# Patient Record
Sex: FEMALE | Race: WHITE | NOT HISPANIC OR LATINO | URBAN - METROPOLITAN AREA
[De-identification: names, ages, dates, MRNs, and addresses within clinical notes are randomized per-mention and may not be internally consistent; named-entity substitution may affect disease eponyms.]

---

## 2017-01-23 ENCOUNTER — EMERGENCY (EMERGENCY)
Facility: HOSPITAL | Age: 57
LOS: 1 days | Discharge: HOME | End: 2017-01-23
Attending: STUDENT IN AN ORGANIZED HEALTH CARE EDUCATION/TRAINING PROGRAM
Payer: COMMERCIAL

## 2017-01-23 VITALS
TEMPERATURE: 98 F | RESPIRATION RATE: 16 BRPM | HEIGHT: 66 IN | OXYGEN SATURATION: 97 % | SYSTOLIC BLOOD PRESSURE: 96 MMHG | WEIGHT: 115.08 LBS | DIASTOLIC BLOOD PRESSURE: 61 MMHG | HEART RATE: 74 BPM

## 2017-01-23 VITALS
OXYGEN SATURATION: 98 % | HEART RATE: 74 BPM | SYSTOLIC BLOOD PRESSURE: 104 MMHG | TEMPERATURE: 98 F | DIASTOLIC BLOOD PRESSURE: 59 MMHG | RESPIRATION RATE: 16 BRPM

## 2017-01-23 DIAGNOSIS — Z90.710 ACQUIRED ABSENCE OF BOTH CERVIX AND UTERUS: Chronic | ICD-10-CM

## 2017-01-23 DIAGNOSIS — R07.81 PLEURODYNIA: ICD-10-CM

## 2017-01-23 DIAGNOSIS — M94.0 CHONDROCOSTAL JUNCTION SYNDROME [TIETZE]: ICD-10-CM

## 2017-01-23 DIAGNOSIS — Z98.89 OTHER SPECIFIED POSTPROCEDURAL STATES: Chronic | ICD-10-CM

## 2017-01-23 DIAGNOSIS — J06.9 ACUTE UPPER RESPIRATORY INFECTION, UNSPECIFIED: ICD-10-CM

## 2017-01-23 DIAGNOSIS — J40 BRONCHITIS, NOT SPECIFIED AS ACUTE OR CHRONIC: ICD-10-CM

## 2017-01-23 LAB
ALBUMIN SERPL ELPH-MCNC: 3.9 G/DL — SIGNIFICANT CHANGE UP (ref 3.3–5)
ALP SERPL-CCNC: 117 U/L — SIGNIFICANT CHANGE UP (ref 40–120)
ALT FLD-CCNC: 20 U/L — SIGNIFICANT CHANGE UP (ref 12–78)
ANION GAP SERPL CALC-SCNC: 7 MMOL/L — SIGNIFICANT CHANGE UP (ref 5–17)
APTT BLD: 29.3 SEC — SIGNIFICANT CHANGE UP (ref 27.5–37.4)
AST SERPL-CCNC: 13 U/L — LOW (ref 15–37)
BASOPHILS # BLD AUTO: 0.1 K/UL — SIGNIFICANT CHANGE UP (ref 0–0.2)
BASOPHILS NFR BLD AUTO: 1.7 % — SIGNIFICANT CHANGE UP (ref 0–2)
BILIRUB SERPL-MCNC: 0.3 MG/DL — SIGNIFICANT CHANGE UP (ref 0.2–1.2)
BUN SERPL-MCNC: 12 MG/DL — SIGNIFICANT CHANGE UP (ref 7–23)
CALCIUM SERPL-MCNC: 9.1 MG/DL — SIGNIFICANT CHANGE UP (ref 8.5–10.1)
CHLORIDE SERPL-SCNC: 106 MMOL/L — SIGNIFICANT CHANGE UP (ref 96–108)
CO2 SERPL-SCNC: 32 MMOL/L — HIGH (ref 22–31)
CREAT SERPL-MCNC: 0.76 MG/DL — SIGNIFICANT CHANGE UP (ref 0.5–1.3)
D DIMER BLD IA.RAPID-MCNC: <150 NG/ML DDU — SIGNIFICANT CHANGE UP
EOSINOPHIL # BLD AUTO: 0.1 K/UL — SIGNIFICANT CHANGE UP (ref 0–0.5)
EOSINOPHIL NFR BLD AUTO: 1.9 % — SIGNIFICANT CHANGE UP (ref 0–6)
GLUCOSE SERPL-MCNC: 69 MG/DL — LOW (ref 70–99)
HCT VFR BLD CALC: 43.8 % — SIGNIFICANT CHANGE UP (ref 34.5–45)
HGB BLD-MCNC: 15.4 G/DL — SIGNIFICANT CHANGE UP (ref 11.5–15.5)
INR BLD: 1.01 RATIO — SIGNIFICANT CHANGE UP (ref 0.88–1.16)
LYMPHOCYTES # BLD AUTO: 1.8 K/UL — SIGNIFICANT CHANGE UP (ref 1–3.3)
LYMPHOCYTES # BLD AUTO: 24.2 % — SIGNIFICANT CHANGE UP (ref 13–44)
MCHC RBC-ENTMCNC: 30.3 PG — SIGNIFICANT CHANGE UP (ref 27–34)
MCHC RBC-ENTMCNC: 35.2 GM/DL — SIGNIFICANT CHANGE UP (ref 32–36)
MCV RBC AUTO: 86.1 FL — SIGNIFICANT CHANGE UP (ref 80–100)
MONOCYTES # BLD AUTO: 0.4 K/UL — SIGNIFICANT CHANGE UP (ref 0–0.9)
MONOCYTES NFR BLD AUTO: 5.9 % — SIGNIFICANT CHANGE UP (ref 2–14)
NEUTROPHILS # BLD AUTO: 5 K/UL — SIGNIFICANT CHANGE UP (ref 1.8–7.4)
NEUTROPHILS NFR BLD AUTO: 66.4 % — SIGNIFICANT CHANGE UP (ref 43–77)
PLATELET # BLD AUTO: 216 K/UL — SIGNIFICANT CHANGE UP (ref 150–400)
POTASSIUM SERPL-MCNC: 4.4 MMOL/L — SIGNIFICANT CHANGE UP (ref 3.5–5.3)
POTASSIUM SERPL-SCNC: 4.4 MMOL/L — SIGNIFICANT CHANGE UP (ref 3.5–5.3)
PROT SERPL-MCNC: 7.6 GM/DL — SIGNIFICANT CHANGE UP (ref 6–8.3)
PROTHROM AB SERPL-ACNC: 11.3 SEC — SIGNIFICANT CHANGE UP (ref 10–13.1)
RBC # BLD: 5.09 M/UL — SIGNIFICANT CHANGE UP (ref 3.8–5.2)
RBC # FLD: 14.7 % — SIGNIFICANT CHANGE UP (ref 11–15)
SODIUM SERPL-SCNC: 145 MMOL/L — SIGNIFICANT CHANGE UP (ref 135–145)
WBC # BLD: 7.6 K/UL — SIGNIFICANT CHANGE UP (ref 3.8–10.5)
WBC # FLD AUTO: 7.6 K/UL — SIGNIFICANT CHANGE UP (ref 3.8–10.5)

## 2017-01-23 PROCEDURE — 99053 MED SERV 10PM-8AM 24 HR FAC: CPT

## 2017-01-23 PROCEDURE — 99285 EMERGENCY DEPT VISIT HI MDM: CPT | Mod: 25

## 2017-01-23 PROCEDURE — 76700 US EXAM ABDOM COMPLETE: CPT | Mod: 26

## 2017-01-23 PROCEDURE — 71010: CPT | Mod: 26

## 2017-01-23 RX ORDER — FAMOTIDINE 10 MG/ML
20 INJECTION INTRAVENOUS ONCE
Qty: 0 | Refills: 0 | Status: DISCONTINUED | OUTPATIENT
Start: 2017-01-23 | End: 2017-01-23

## 2017-01-23 RX ORDER — ONDANSETRON 8 MG/1
8 TABLET, FILM COATED ORAL ONCE
Qty: 0 | Refills: 0 | Status: DISCONTINUED | OUTPATIENT
Start: 2017-01-23 | End: 2017-01-23

## 2017-01-23 RX ORDER — SODIUM CHLORIDE 9 MG/ML
1000 INJECTION INTRAMUSCULAR; INTRAVENOUS; SUBCUTANEOUS
Qty: 0 | Refills: 0 | Status: DISCONTINUED | OUTPATIENT
Start: 2017-01-23 | End: 2017-01-23

## 2017-01-23 RX ORDER — SODIUM CHLORIDE 9 MG/ML
1000 INJECTION INTRAMUSCULAR; INTRAVENOUS; SUBCUTANEOUS
Qty: 0 | Refills: 0 | Status: DISCONTINUED | OUTPATIENT
Start: 2017-01-23 | End: 2017-01-27

## 2017-01-23 RX ADMIN — Medication 1 TABLET(S): at 13:07

## 2017-01-23 RX ADMIN — SODIUM CHLORIDE 125 MILLILITER(S): 9 INJECTION INTRAMUSCULAR; INTRAVENOUS; SUBCUTANEOUS at 08:54

## 2017-01-23 NOTE — ED PROVIDER NOTE - OBJECTIVE STATEMENT
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

## 2017-01-23 NOTE — ED ADULT NURSE NOTE - OBJECTIVE STATEMENT
Right rib cage pain with coughing and deep inspiration since Friday afternoon, cough for the past 2 weeks unrelieved with cough suppressant received from the doctor.

## 2017-01-23 NOTE — ED PROVIDER NOTE - MEDICAL DECISION MAKING DETAILS
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

## 2017-01-23 NOTE — ED ADULT NURSE NOTE - CAS EDN DISCHARGE ASSESSMENT
Symptoms improved/Alert and oriented to person, place and time/No adverse reaction to first time med in ED

## 2017-01-23 NOTE — ED PROVIDER NOTE - CARE PLAN
Principal Discharge DX:	Upper respiratory infection Principal Discharge DX:	Upper respiratory infection  Secondary Diagnosis:	Bronchitis  Secondary Diagnosis:	Costochondritis

## 2017-04-03 NOTE — ED PROVIDER NOTE - CPE EDP GASTRO NORM
Scheduled procedure with Patient at  [phone] (home)   Scheduled Via: Case Creation for [facility], case # [id number]  Patient prefers  facility rather than the doctor's preferred facility  Procedure date: 04-19-17  Procedure time: 1000AM  Rep Contacted?: n/a  [place] Program code:ACCESS CODE:[id number]      prep letter?: n/a      
normal...

## 2017-12-20 ENCOUNTER — INPATIENT (INPATIENT)
Facility: HOSPITAL | Age: 57
LOS: 1 days | Discharge: HOME | End: 2017-12-22
Attending: HOSPITALIST | Admitting: HOSPITALIST
Payer: COMMERCIAL

## 2017-12-20 VITALS
RESPIRATION RATE: 20 BRPM | WEIGHT: 119.93 LBS | DIASTOLIC BLOOD PRESSURE: 86 MMHG | HEIGHT: 66 IN | OXYGEN SATURATION: 91 % | SYSTOLIC BLOOD PRESSURE: 122 MMHG | HEART RATE: 108 BPM | TEMPERATURE: 99 F

## 2017-12-20 DIAGNOSIS — J44.1 CHRONIC OBSTRUCTIVE PULMONARY DISEASE WITH (ACUTE) EXACERBATION: ICD-10-CM

## 2017-12-20 DIAGNOSIS — Z98.89 OTHER SPECIFIED POSTPROCEDURAL STATES: Chronic | ICD-10-CM

## 2017-12-20 DIAGNOSIS — Z90.710 ACQUIRED ABSENCE OF BOTH CERVIX AND UTERUS: Chronic | ICD-10-CM

## 2017-12-20 LAB
ALBUMIN SERPL ELPH-MCNC: 3.5 G/DL — SIGNIFICANT CHANGE UP (ref 3.3–5)
ALP SERPL-CCNC: 102 U/L — SIGNIFICANT CHANGE UP (ref 40–120)
ALT FLD-CCNC: 28 U/L — SIGNIFICANT CHANGE UP (ref 12–78)
ANION GAP SERPL CALC-SCNC: 7 MMOL/L — SIGNIFICANT CHANGE UP (ref 5–17)
APPEARANCE UR: CLEAR — SIGNIFICANT CHANGE UP
AST SERPL-CCNC: 22 U/L — SIGNIFICANT CHANGE UP (ref 15–37)
BACTERIA # UR AUTO: ABNORMAL
BASOPHILS # BLD AUTO: 0.1 K/UL — SIGNIFICANT CHANGE UP (ref 0–0.2)
BASOPHILS NFR BLD AUTO: 1 % — SIGNIFICANT CHANGE UP (ref 0–2)
BILIRUB SERPL-MCNC: 0.7 MG/DL — SIGNIFICANT CHANGE UP (ref 0.2–1.2)
BILIRUB UR-MCNC: NEGATIVE — SIGNIFICANT CHANGE UP
BUN SERPL-MCNC: 14 MG/DL — SIGNIFICANT CHANGE UP (ref 7–23)
CALCIUM SERPL-MCNC: 9 MG/DL — SIGNIFICANT CHANGE UP (ref 8.5–10.1)
CHLORIDE SERPL-SCNC: 101 MMOL/L — SIGNIFICANT CHANGE UP (ref 96–108)
CK MB CFR SERPL CALC: 2.5 NG/ML — SIGNIFICANT CHANGE UP (ref 0.5–3.6)
CO2 SERPL-SCNC: 30 MMOL/L — SIGNIFICANT CHANGE UP (ref 22–31)
COLOR SPEC: YELLOW — SIGNIFICANT CHANGE UP
CREAT SERPL-MCNC: 0.7 MG/DL — SIGNIFICANT CHANGE UP (ref 0.5–1.3)
DIFF PNL FLD: ABNORMAL
EOSINOPHIL # BLD AUTO: 0 K/UL — SIGNIFICANT CHANGE UP (ref 0–0.5)
EOSINOPHIL NFR BLD AUTO: 0.1 % — SIGNIFICANT CHANGE UP (ref 0–6)
EPI CELLS # UR: SIGNIFICANT CHANGE UP
FLUAV SPEC QL CULT: NEGATIVE — SIGNIFICANT CHANGE UP
FLUBV AG SPEC QL IA: NEGATIVE — SIGNIFICANT CHANGE UP
GLUCOSE SERPL-MCNC: 125 MG/DL — HIGH (ref 70–99)
GLUCOSE UR QL: 250 MG/DL
HCT VFR BLD CALC: 43.9 % — SIGNIFICANT CHANGE UP (ref 34.5–45)
HGB BLD-MCNC: 15 G/DL — SIGNIFICANT CHANGE UP (ref 11.5–15.5)
KETONES UR-MCNC: ABNORMAL
LACTATE SERPL-SCNC: 1.3 MMOL/L — SIGNIFICANT CHANGE UP (ref 0.7–2)
LEUKOCYTE ESTERASE UR-ACNC: ABNORMAL
LYMPHOCYTES # BLD AUTO: 0.7 K/UL — LOW (ref 1–3.3)
LYMPHOCYTES # BLD AUTO: 8 % — LOW (ref 13–44)
MAGNESIUM SERPL-MCNC: 2.3 MG/DL — SIGNIFICANT CHANGE UP (ref 1.6–2.6)
MCHC RBC-ENTMCNC: 31.1 PG — SIGNIFICANT CHANGE UP (ref 27–34)
MCHC RBC-ENTMCNC: 34.2 GM/DL — SIGNIFICANT CHANGE UP (ref 32–36)
MCV RBC AUTO: 91.1 FL — SIGNIFICANT CHANGE UP (ref 80–100)
MONOCYTES # BLD AUTO: 0.9 K/UL — SIGNIFICANT CHANGE UP (ref 0–0.9)
MONOCYTES NFR BLD AUTO: 9.9 % — SIGNIFICANT CHANGE UP (ref 2–14)
NEUTROPHILS # BLD AUTO: 7.4 K/UL — SIGNIFICANT CHANGE UP (ref 1.8–7.4)
NEUTROPHILS NFR BLD AUTO: 81 % — HIGH (ref 43–77)
NITRITE UR-MCNC: NEGATIVE — SIGNIFICANT CHANGE UP
PH UR: 5 — SIGNIFICANT CHANGE UP (ref 5–8)
PLATELET # BLD AUTO: 214 K/UL — SIGNIFICANT CHANGE UP (ref 150–400)
POTASSIUM SERPL-MCNC: 3.9 MMOL/L — SIGNIFICANT CHANGE UP (ref 3.5–5.3)
POTASSIUM SERPL-SCNC: 3.9 MMOL/L — SIGNIFICANT CHANGE UP (ref 3.5–5.3)
PROT SERPL-MCNC: 8.2 GM/DL — SIGNIFICANT CHANGE UP (ref 6–8.3)
PROT UR-MCNC: 30 MG/DL
RBC # BLD: 4.82 M/UL — SIGNIFICANT CHANGE UP (ref 3.8–5.2)
RBC # FLD: 12.4 % — SIGNIFICANT CHANGE UP (ref 11–15)
RBC CASTS # UR COMP ASSIST: ABNORMAL /HPF (ref 0–4)
SODIUM SERPL-SCNC: 138 MMOL/L — SIGNIFICANT CHANGE UP (ref 135–145)
SP GR SPEC: 1.02 — SIGNIFICANT CHANGE UP (ref 1.01–1.02)
TROPONIN I SERPL-MCNC: <.015 NG/ML — SIGNIFICANT CHANGE UP (ref 0.01–0.04)
UROBILINOGEN FLD QL: 1 MG/DL
WBC # BLD: 9.1 K/UL — SIGNIFICANT CHANGE UP (ref 3.8–10.5)
WBC # FLD AUTO: 9.1 K/UL — SIGNIFICANT CHANGE UP (ref 3.8–10.5)
WBC UR QL: SIGNIFICANT CHANGE UP

## 2017-12-20 PROCEDURE — 99285 EMERGENCY DEPT VISIT HI MDM: CPT

## 2017-12-20 PROCEDURE — 99223 1ST HOSP IP/OBS HIGH 75: CPT

## 2017-12-20 PROCEDURE — 71010: CPT | Mod: 26

## 2017-12-20 RX ORDER — IPRATROPIUM/ALBUTEROL SULFATE 18-103MCG
3 AEROSOL WITH ADAPTER (GRAM) INHALATION ONCE
Qty: 0 | Refills: 0 | Status: COMPLETED | OUTPATIENT
Start: 2017-12-20 | End: 2017-12-20

## 2017-12-20 RX ORDER — AZITHROMYCIN 500 MG/1
500 TABLET, FILM COATED ORAL ONCE
Qty: 0 | Refills: 0 | Status: COMPLETED | OUTPATIENT
Start: 2017-12-20 | End: 2017-12-20

## 2017-12-20 RX ORDER — INFLUENZA VIRUS VACCINE 15; 15; 15; 15 UG/.5ML; UG/.5ML; UG/.5ML; UG/.5ML
0.5 SUSPENSION INTRAMUSCULAR ONCE
Qty: 0 | Refills: 0 | Status: COMPLETED | OUTPATIENT
Start: 2017-12-20 | End: 2017-12-22

## 2017-12-20 RX ORDER — CEFTRIAXONE 500 MG/1
1 INJECTION, POWDER, FOR SOLUTION INTRAMUSCULAR; INTRAVENOUS ONCE
Qty: 0 | Refills: 0 | Status: COMPLETED | OUTPATIENT
Start: 2017-12-20 | End: 2017-12-20

## 2017-12-20 RX ORDER — IPRATROPIUM/ALBUTEROL SULFATE 18-103MCG
3 AEROSOL WITH ADAPTER (GRAM) INHALATION EVERY 6 HOURS
Qty: 0 | Refills: 0 | Status: DISCONTINUED | OUTPATIENT
Start: 2017-12-20 | End: 2017-12-22

## 2017-12-20 RX ADMIN — Medication 3 MILLILITER(S): at 18:21

## 2017-12-20 RX ADMIN — AZITHROMYCIN 255 MILLIGRAM(S): 500 TABLET, FILM COATED ORAL at 17:04

## 2017-12-20 RX ADMIN — CEFTRIAXONE 100 GRAM(S): 500 INJECTION, POWDER, FOR SOLUTION INTRAMUSCULAR; INTRAVENOUS at 16:21

## 2017-12-20 RX ADMIN — Medication 3 MILLILITER(S): at 16:20

## 2017-12-20 RX ADMIN — Medication 3 MILLILITER(S): at 18:11

## 2017-12-20 RX ADMIN — Medication 125 MILLIGRAM(S): at 18:11

## 2017-12-20 RX ADMIN — Medication 3 MILLILITER(S): at 23:56

## 2017-12-20 RX ADMIN — Medication 40 MILLIGRAM(S): at 22:56

## 2017-12-20 NOTE — H&P ADULT - NSHPREVIEWOFSYSTEMS_GEN_ALL_CORE
CONSTITUTIONAL: No fever, weight loss, or fatigue  EYES: No eye pain, visual disturbances, or discharge  ENMT:  No difficulty hearing, tinnitus, vertigo; No sinus or throat pain  NECK: No pain or stiffness  RESPIRATORY: see history of present illness   CARDIOVASCULAR: No chest pain, palpitations, dizziness, or leg swelling  GASTROINTESTINAL: No abdominal or epigastric pain. No nausea, vomiting, or hematemesis; No diarrhea or constipation. No melena or hematochezia.  GENITOURINARY: No dysuria, frequency, hematuria, or incontinence  NEUROLOGICAL: No headaches, memory loss, loss of strength, numbness, or tremors  SKIN: No itching, burning, rashes, or lesions   LYMPH NODES: No enlarged glands  ENDOCRINE: No heat or cold intolerance; No hair loss  MUSCULOSKELETAL: No joint pain or swelling; No muscle, back, or extremity pain  PSYCHIATRIC: No depression, anxiety, mood swings, or difficulty sleeping  HEME/LYMPH: No easy bruising, or bleeding gums  ALLERGY AND IMMUNOLOGIC: No hives or eczema

## 2017-12-20 NOTE — H&P ADULT - NSHPLABSRESULTS_GEN_ALL_CORE
15.0   9.1   )-----------( 214      ( 20 Dec 2017 16:19 )             43.9   12-20    138  |  101  |  14  ----------------------------<  125<H>  3.9   |  30  |  0.70    Ca    9.0      20 Dec 2017 16:19  Mg     2.3     12-20    TPro  8.2  /  Alb  3.5  /  TBili  0.7  /  DBili  x   /  AST  22  /  ALT  28  /  AlkPhos  102  12-20  < from: Xray Chest 1 View AP/PA. (12.20.17 @ 16:59) >    Clear  lungs.  No acute airspace disease suggested.    < end of copied text >

## 2017-12-20 NOTE — H&P ADULT - ASSESSMENT
55former smoker with short of breath with cough     IMPROVE VTE Individual Risk Assessment          RISK                                                          Points    [  ] Previous VTE                                                3    [  ] Thrombophilia                                             2    [  ] Lower limb paralysis                                    2        (unable to hold up >15 seconds)      [  ] Current Cancer                                             2         (within 6 months)    [  ] Immobilization > 24 hrs                              1    [  ] ICU/CCU stay > 24 hours                            1    [  ] Age > 60                                                    1    IMPROVE VTE Score _____0____

## 2017-12-20 NOTE — ED ADULT TRIAGE NOTE - CHIEF COMPLAINT QUOTE
shortness of breath  coughing , paused lip breathing / tripoding  light headed , denies chest pain   onset  2days ago , getting worse

## 2017-12-20 NOTE — ED PROVIDER NOTE - OBJECTIVE STATEMENT
Pertinent PMH/PSH/FHx/SHx and Review of Systems contained within:  57F hx of smoking pw sob. symptoms began last week and worsening last night. notes non productive cough and increase in sob when coughing. no fever, cp, nausea, vomiting, abd pain. quit smoking last week as a result of her shortness of breath.   Fh and Sh not otherwise contributory  ROS otherwise negative

## 2017-12-20 NOTE — CONSULT NOTE ADULT - SUBJECTIVE AND OBJECTIVE BOX
Patient is a 57y old  Female who presents with a chief complaint of short of breath (20 Dec 2017 19:15)    HPI:  57F with no significant hx expect smoking for 37 years .  Came with SOB and cough along with chills for 4-5 days. 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 Denies fever but have been having hot and cold feelings for last few days.  Did not get flue vaccine.  37 pack year history of smoking, quit 1 week ago.    PAST MEDICAL & SURGICAL HISTORY:  No pertinent past medical history  H/O total hysterectomy  H/O:     FAMILY HISTORY:  No pertinent family history in first degree relatives    SOCIAL HISTORY: BMI (kg/m2): 19.4 . 37 pack year history of smoking, quit 1 week ago.    Allergies  penicillin (Rash)    MEDICATIONS  (STANDING):  ALBUTerol/ipratropium for Nebulization 3 milliLiter(s) Nebulizer every 6 hours  methylPREDNISolone sodium succinate Injectable 40 milliGRAM(s) IV Push every 6 hours    REVIEW OF SYSTEMS:    Constitutional:            No fever, weight loss or fatigue  HEENT:                     No difficulty hearing, tinnitus, vertigo; No sinus or throat pain  Respiratory:              sob and cough.  Cardiovascular:          No chest pain, palpitations  Gastrointestinal:         No abdominal or epigastric pain. No N/V/diarrhea   Genitourinary:            No dysuria, frequency, hematuria or incontinence  SKIN:                          no rash  Musculoskeletal:        No joint pain or swelling  Extremities:                No swelling  Neurological:              No headaches  Psychiatric:                 No depression, anxiety    Vital Signs Last 24 Hrs  T(C): 37 (20 Dec 2017 14:51), Max: 37 (20 Dec 2017 14:51)  T(F): 98.6 (20 Dec 2017 14:51), Max: 98.6 (20 Dec 2017 14:51)  HR: 108 (20 Dec 2017 14:51) (108 - 108)  BP: 122/86 (20 Dec 2017 14:51) (122/86 - 122/86)  BP(mean): --  RR: 20 (20 Dec 2017 14:51) (20 - 20)  SpO2: 91% (20 Dec 2017 14:51) (91% - 91%)    PHYSICAL EXAM:  GEN:        Awake, responsive and comfortable.  HEENT:    Normal.    RESP:         decreased air entry.  CVS:           Regular rate and rhythm.   ABD:         Soft, non-tender, non-distended;   :             No costovertebral angle tenderness  SKIN:           Warm and dry.  EXTR:            No clubbing, cyanosis or edema  CNS:              Intact sensory and motor function.  PSYCH:        cooperative, no anxiety or depression    LABS:                        15.0   9.1   )-----------( 214      ( 20 Dec 2017 16:19 )             43.9         138  |  101  |  14  ----------------------------<  125<H>  3.9   |  30  |  0.70    Ca    9.0      20 Dec 2017 16:19  Mg     2.3         TPro  8.2  /  Alb  3.5  /  TBili  0.7  /  DBili  x   /  AST  22  /  ALT  28  /  AlkPhos  102      EKG: sinus tachycardia    RADIOLOGY & ADDITIONAL STUDIES:  < from: Xray Chest 1 View AP/PA. (17 @ 16:59) >    EXAM:  CHEST SINGLE VIEW                          PROCEDURE DATE:  2017      INTERPRETATION:  cough    A frontal chest film  demonstrates grossly clear lungs.  No acute   infiltrate or consolidation is  noted. The costophrenic angles are   grossly clear.    The heart size and vascular markings are within normal limits for   technique.      No mediastinal or hilar adenopathy is suggested. .     The osseous structures appear intact intact.     IMPRESSION:  Clear  lungs.  No acute airspace disease suggested.    ANKIT GREENBERG M.D., ATTENDING RADIOLOGIST  This document has been electronically signed. Dec 20 2017  5:09PM      ASSESSMENT AND PLAN:  ·	SOB with wheezing.  ·	Acute COPD exacerbation.  ·	Tobacco abuse.    Nebulizer with short course of steroids.  Smoking cessation.  PFT out pt.

## 2017-12-20 NOTE — ED ADULT NURSE NOTE - OBJECTIVE STATEMENT
Pr c/o of coughing and difficulty breathing  since monday. Denies any chest pain. no Hs of asthma or copd

## 2017-12-20 NOTE — H&P ADULT - HISTORY OF PRESENT ILLNESS
57F hx of smoking pw sob. symptoms began last week and worsening last night. notes non productive cough and increase in sob when coughing. no fever, cp, nausea, vomiting, abd pain. quit smoking last week as a result of her shortness of breath.

## 2017-12-20 NOTE — ED PROVIDER NOTE - PHYSICAL EXAMINATION
Gen: Alert, NAD  Head: NC, AT   Eyes: PERRL, EOMI, normal lids/conjunctiva  ENT: normal hearing, patent oropharynx without erythema/exudate, uvula midline  Neck: supple, no tenderness, Trachea midline  Pulm: crackles throughout the lung fields, increased work of breathing, rr 28  CV: tachycardic. no M/R/G, 2+ radial and dp pulses bl, no edema  Abd: soft, NT/ND, +BS, no hepatosplenomegaly  Mskel: extremities x4 with normal ROM and no joint effusions. no ctl spine ttp.   Skin: no rash, no bruising   Neuro: AAOx3, no sensory/motor deficits, CN 2-12 intact

## 2017-12-20 NOTE — ED PROVIDER NOTE - MEDICAL DECISION MAKING DETAILS
patient pw respiratory compromise. suspect pna vs flu vs copd exacerbation. patient pw respiratory compromise. suspect pna vs flu vs copd exacerbation.   flu neg, cxray neg. likely copd exacerbation. will admit for nebs and steroids. patient pw respiratory compromise. suspect pna vs flu vs copd exacerbation.   flu neg, cxray neg. likely copd exacerbation. will admit for nebs and steroids.  I read ekg as sinus tach with no ischemic changes of st elevation or depression.

## 2017-12-20 NOTE — H&P ADULT - NSHPPHYSICALEXAM_GEN_ALL_CORE
GENERAL: NAD well-developed  HEAD:  Atraumatic, Normocephalic  EYES: EOMI, PERRLA, conjunctiva and sclera clear  ENMT: No tonsillar erythema, exudates, or enlargement; Moist mucous membranes, Good dentition, No lesions  NECK: Supple, No JVD, Normal thyroid  NERVOUS SYSTEM:  Alert & Oriented X3, Good concentration; Motor Strength 5/5 B/L upper and lower extremities; DTRs 2+ intact and symmetric  CHEST/LUNG: fine crackle throughout both lung fields with occasional wheezing to lower base   HEART: Regular rate and rhythm; No murmurs, rubs, or gallops  ABDOMEN: Soft, Nontender, Nondistended; Bowel sounds present  EXTREMITIES:  2+ Peripheral Pulses, No clubbing, cyanosis, or edema  LYMPH: No lymphadenopathy   SKIN: No rashes or lesions

## 2017-12-21 DIAGNOSIS — R73.09 OTHER ABNORMAL GLUCOSE: ICD-10-CM

## 2017-12-21 DIAGNOSIS — Z29.9 ENCOUNTER FOR PROPHYLACTIC MEASURES, UNSPECIFIED: ICD-10-CM

## 2017-12-21 DIAGNOSIS — B34.1 ENTEROVIRUS INFECTION, UNSPECIFIED: ICD-10-CM

## 2017-12-21 LAB
ANION GAP SERPL CALC-SCNC: 9 MMOL/L — SIGNIFICANT CHANGE UP (ref 5–17)
BUN SERPL-MCNC: 13 MG/DL — SIGNIFICANT CHANGE UP (ref 7–23)
CALCIUM SERPL-MCNC: 8.9 MG/DL — SIGNIFICANT CHANGE UP (ref 8.5–10.1)
CHLORIDE SERPL-SCNC: 102 MMOL/L — SIGNIFICANT CHANGE UP (ref 96–108)
CO2 SERPL-SCNC: 29 MMOL/L — SIGNIFICANT CHANGE UP (ref 22–31)
CREAT SERPL-MCNC: 0.78 MG/DL — SIGNIFICANT CHANGE UP (ref 0.5–1.3)
GLUCOSE SERPL-MCNC: 164 MG/DL — HIGH (ref 70–99)
HCT VFR BLD CALC: 44.6 % — SIGNIFICANT CHANGE UP (ref 34.5–45)
HGB BLD-MCNC: 14.9 G/DL — SIGNIFICANT CHANGE UP (ref 11.5–15.5)
MCHC RBC-ENTMCNC: 29.8 PG — SIGNIFICANT CHANGE UP (ref 27–34)
MCHC RBC-ENTMCNC: 33.3 GM/DL — SIGNIFICANT CHANGE UP (ref 32–36)
MCV RBC AUTO: 89.4 FL — SIGNIFICANT CHANGE UP (ref 80–100)
PLATELET # BLD AUTO: 238 K/UL — SIGNIFICANT CHANGE UP (ref 150–400)
POTASSIUM SERPL-MCNC: 3.7 MMOL/L — SIGNIFICANT CHANGE UP (ref 3.5–5.3)
POTASSIUM SERPL-SCNC: 3.7 MMOL/L — SIGNIFICANT CHANGE UP (ref 3.5–5.3)
RBC # BLD: 5 M/UL — SIGNIFICANT CHANGE UP (ref 3.8–5.2)
RBC # FLD: 11.9 % — SIGNIFICANT CHANGE UP (ref 11–15)
SODIUM SERPL-SCNC: 140 MMOL/L — SIGNIFICANT CHANGE UP (ref 135–145)
WBC # BLD: 8.5 K/UL — SIGNIFICANT CHANGE UP (ref 3.8–10.5)
WBC # FLD AUTO: 8.5 K/UL — SIGNIFICANT CHANGE UP (ref 3.8–10.5)

## 2017-12-21 PROCEDURE — 99233 SBSQ HOSP IP/OBS HIGH 50: CPT

## 2017-12-21 RX ADMIN — Medication 3 MILLILITER(S): at 23:38

## 2017-12-21 RX ADMIN — Medication 3 MILLILITER(S): at 05:24

## 2017-12-21 RX ADMIN — Medication 3 MILLILITER(S): at 17:36

## 2017-12-21 RX ADMIN — Medication 3 MILLILITER(S): at 11:13

## 2017-12-21 RX ADMIN — Medication 40 MILLIGRAM(S): at 05:34

## 2017-12-21 RX ADMIN — Medication 40 MILLIGRAM(S): at 11:04

## 2017-12-21 RX ADMIN — Medication 40 MILLIGRAM(S): at 22:17

## 2017-12-21 NOTE — PROGRESS NOTE ADULT - SUBJECTIVE AND OBJECTIVE BOX
INTERVAL HPI:  57F with no significant hx expect smoking for 37 years .  Came with SOB and cough along with chills for 4-5 days. 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 Denies fever but have been having hot and cold feelings for last few days.  Did not get flue vaccine.  37 pack year history of smoking, quit 1 week ago.    OVERNIGHT EVENTS:  Awake, comfortable and feels better.    Vital Signs Last 24 Hrs  T(C): 36.6 (21 Dec 2017 10:40), Max: 37.7 (20 Dec 2017 20:49)  T(F): 97.8 (21 Dec 2017 10:40), Max: 99.9 (20 Dec 2017 20:49)  HR: 77 (21 Dec 2017 11:14) (74 - 94)  BP: 110/71 (21 Dec 2017 10:40) (99/68 - 130/73)  BP(mean): --  RR: 16 (21 Dec 2017 10:40) (14 - 19)  SpO2: 97% (21 Dec 2017 11:14) (94% - 100%)    PHYSICAL EXAM:  GEN:         Awake, responsive and comfortable.  HEENT:    Normal.    RESP:       no wheezing.  CVS:         Regular rate and rhythm.   ABD:         Soft, non-tender, non-distended;     MEDICATIONS  (STANDING):  ALBUTerol/ipratropium for Nebulization 3 milliLiter(s) Nebulizer every 6 hours  influenza   Vaccine 0.5 milliLiter(s) IntraMuscular once  methylPREDNISolone sodium succinate Injectable 40 milliGRAM(s) IV Push every 6 hours    LABS:                        14.9   8.5   )-----------( 238      ( 21 Dec 2017 06:57 )             44.6     12-    140  |  102  |  13  ----------------------------<  164<H>  3.7   |  29  |  0.78    Ca    8.9      21 Dec 2017 06:57  Mg     2.3     12-    TPro  8.2  /  Alb  3.5  /  TBili  0.7  /  DBili  x   /  AST  22  /  ALT  28  /  AlkPhos  102  12-20    Urinalysis Basic - ( 20 Dec 2017 22:36 )    Color: Yellow / Appearance: Clear / S.020 / pH: x  Gluc: x / Ketone: Small  / Bili: Negative / Urobili: 1 mg/dL   Blood: x / Protein: 30 mg/dL / Nitrite: Negative   Leuk Esterase: Trace / RBC: 3-5 /HPF / WBC 0-2   Sq Epi: x / Non Sq Epi: Few / Bacteria: Moderate  ASSESSMENT AND PLAN:  ·	SOB with wheezing.  ·	Acute COPD exacerbation.  ·	Tobacco abuse.    Will continue nebulizer and reduce steroids.  Smoking cessation.  PFT out pt.

## 2017-12-21 NOTE — DIETITIAN INITIAL EVALUATION ADULT. - ETIOLOGY
reduced/inconsistent appetite/intake, borderline diabetes ? Dx reduced/inconsistent appetite/intake, increased exertion breathing

## 2017-12-21 NOTE — DIETITIAN INITIAL EVALUATION ADULT. - PERTINENT LABORATORY DATA
12-21 Na140 mmol/L Glu 164 mg/dL<H> K+ 3.7 mmol/L Cr  0.78 mg/dL BUN 13 mg/dL Phos n/a   Alb n/a   PAB n/a. POCT blood glucose not checked

## 2017-12-21 NOTE — DIETITIAN INITIAL EVALUATION ADULT. - PHYSICAL APPEARANCE
well nourished/BMI 16.4. No edema. Pt does not appear underweight. Nutrition focused physical exam conducted ; found signs of malnutrition [ X]absent [ ]present.  Subcutaneous fat loss: [ ] Orbital fat pads region, [ ]Buccal fat region, [ ]Triceps region,  [ ]Ribs region.  Muscle wasting: [ ]Temples region, [ ]Clavicle region, [ ]Shoulder region, [ ]Scapula region, [ ]Interosseous region,  [ ]thigh region, [ ]Calf region well nourished/BMI 19.3. No edema. Pt does not appear underweight. Nutrition focused physical exam conducted ; found signs of malnutrition [ X]absent [ ]present.  Subcutaneous fat loss: [ ] Orbital fat pads region, [ ]Buccal fat region, [ ]Triceps region,  [ ]Ribs region.  Muscle wasting: [ ]Temples region, [ ]Clavicle region, [ ]Shoulder region, [ ]Scapula region, [ ]Interosseous region,  [ ]thigh region, [ ]Calf region

## 2017-12-21 NOTE — DIETITIAN INITIAL EVALUATION ADULT. - ENERGY NEEDS
Ht= 66", Wt= 46 kg, IBW= 59 kg, %IBW= 78%, UBW= 56.8 kg, %UBW= 81%, BMI= 16.4 Ht= 66", Wt= 54.2 kg, IBW= 59 kg, %IBW= 92%, UBW= 56.8 kg, %UBW= 95%, BMI= 19.3

## 2017-12-21 NOTE — PROGRESS NOTE ADULT - SUBJECTIVE AND OBJECTIVE BOX
Patient is a 57y old  Female who presents with a chief complaint of short of breath (20 Dec 2017 19:15)      INTERVAL HPI/OVERNIGHT EVENTS: no acute events. Pt is enterovirus +. Repots breathing has improved     MEDICATIONS  (STANDING):  ALBUTerol/ipratropium for Nebulization 3 milliLiter(s) Nebulizer every 6 hours  influenza   Vaccine 0.5 milliLiter(s) IntraMuscular once  methylPREDNISolone sodium succinate Injectable 40 milliGRAM(s) IV Push every 6 hours    MEDICATIONS  (PRN):      Allergies    penicillin (Rash)    Intolerances        REVIEW OF SYSTEMS:  CONSTITUTIONAL: No fever, weight loss, or fatigue  EYES: No eye pain, visual disturbances, or discharge  ENMT:  No difficulty hearing, tinnitus, vertigo; No sinus or throat pain  NECK: No pain or stiffness  BREASTS: No pain, masses, or nipple discharge  RESPIRATORY: + cough, wheezing, chills   CARDIOVASCULAR: No chest pain, palpitations, dizziness, or leg swelling  GASTROINTESTINAL: No abdominal or epigastric pain. No nausea, vomiting, or hematemesis; No diarrhea or constipation. No melena or hematochezia.  GENITOURINARY: No dysuria, frequency, hematuria, or incontinence  NEUROLOGICAL: No headaches, memory loss, loss of strength, numbness, or tremors  SKIN: No itching, burning, rashes, or lesions   LYMPH NODES: No enlarged glands  ENDOCRINE: No heat or cold intolerance; No hair loss      Vital Signs Last 24 Hrs  T(C): 36.6 (21 Dec 2017 10:40), Max: 37.7 (20 Dec 2017 20:49)  T(F): 97.8 (21 Dec 2017 10:40), Max: 99.9 (20 Dec 2017 20:49)  HR: 77 (21 Dec 2017 11:14) (74 - 108)  BP: 110/71 (21 Dec 2017 10:40) (99/68 - 130/73)  BP(mean): --  RR: 16 (21 Dec 2017 10:40) (14 - 20)  SpO2: 97% (21 Dec 2017 11:14) (91% - 100%)    PHYSICAL EXAM:  GENERAL: NAD, well-groomed,  HEAD:  Atraumatic, Normocephalic  EYES: EOMI, PERRLA, conjunctiva and sclera clear  ENMT: No tonsillar erythema, exudates, or enlargement; Moist mucous membranes, Good dentition, No lesions  NECK: Supple, No JVD, Normal thyroid  NERVOUS SYSTEM:  Alert & Oriented X3, Good concentration; Motor Strength 5/5 B/L upper and lower extremities; DTRs 2+ intact and symmetric  CHEST/LUNG: wheezing b/l. speaking in full sentences. minimal distress  HEART: Regular rate and rhythm; No murmurs, rubs, or gallops  ABDOMEN: Soft, Nontender, Nondistended; Bowel sounds present  EXTREMITIES:  2+ Peripheral Pulses, No clubbing, cyanosis, or edema  LYMPH: No lymphadenopathy noted  SKIN: No rashes or lesions    LABS:                        14.9   8.5   )-----------( 238      ( 21 Dec 2017 06:57 )             44.6     12-    140  |  102  |  13  ----------------------------<  164<H>  3.7   |  29  |  0.78    Ca    8.9      21 Dec 2017 06:57  Mg     2.3     12-20    TPro  8.2  /  Alb  3.5  /  TBili  0.7  /  DBili  x   /  AST  22  /  ALT  28  /  AlkPhos  102  12-20      Urinalysis Basic - ( 20 Dec 2017 22:36 )    Color: Yellow / Appearance: Clear / S.020 / pH: x  Gluc: x / Ketone: Small  / Bili: Negative / Urobili: 1 mg/dL   Blood: x / Protein: 30 mg/dL / Nitrite: Negative   Leuk Esterase: Trace / RBC: 3-5 /HPF / WBC 0-2   Sq Epi: x / Non Sq Epi: Few / Bacteria: Moderate      CAPILLARY BLOOD GLUCOSE          RADIOLOGY & ADDITIONAL TESTS:    Imaging Personally Reviewed:  [ ] YES  [ ] NO    Consultant(s) Notes Reviewed:  [ x] YES  [ ] NO    Care Discussed with Consultants/Other Providers [ ] YES  [ ] NO

## 2017-12-21 NOTE — DIETITIAN INITIAL EVALUATION ADULT. - NS AS NUTRI INTERV MEALS SNACK
Based on HgbA1c result, consider changing diet Rx to CCHO c snack c Glucerna 8 oz daily (220 kcals, 10 gm pro). Recommend check lipid profile & regular fingersticks.

## 2017-12-21 NOTE — CHART NOTE - NSCHARTNOTEFT_GEN_A_CORE
Upon Nutritional Assessment by the Registered Dietitian your patient was determined to meet criteria / has evidence of the following diagnosis/diagnoses:          [ ]  Mild Protein Calorie Malnutrition        [ X]  Moderate Protein Calorie Malnutrition        [ ] Severe Protein Calorie Malnutrition        [ ] Unspecified Protein Calorie Malnutrition        [ ] Underweight / BMI <19        [ ] Morbid Obesity / BMI > 40      Findings as based on:  •  Comprehensive nutrition assessment and consultation  •  Calorie counts (nutrient intake analysis)  •  Food acceptance and intake status from observations by staff  •  Follow up  •  Patient education  •  Intervention secondary to interdisciplinary rounds  •   concerns      Treatment:    The following diet has been recommended:  Regular c Glucerna 8 oz daily (220 kcals, 10 gm pro). May consider changing to CCHO based on HgbA1c result.     PROVIDER Section:     By signing this assessment you are acknowledging and agree with the diagnosis/diagnoses assigned by the Registered Dietitian    Comments:

## 2017-12-21 NOTE — PROGRESS NOTE ADULT - ASSESSMENT
55 former smoker with likely COPD presents with SOB and cough and c.w copd exacerbation d.t viral illness. pt reports feeling better since admission and remains hemodynamically stable. Will monitor o2 sats off NC

## 2017-12-22 ENCOUNTER — TRANSCRIPTION ENCOUNTER (OUTPATIENT)
Age: 57
End: 2017-12-22

## 2017-12-22 VITALS — OXYGEN SATURATION: 90 %

## 2017-12-22 LAB
CULTURE RESULTS: NO GROWTH — SIGNIFICANT CHANGE UP
HBA1C BLD-MCNC: 5.6 % — SIGNIFICANT CHANGE UP (ref 4–5.6)
SPECIMEN SOURCE: SIGNIFICANT CHANGE UP

## 2017-12-22 PROCEDURE — 99239 HOSP IP/OBS DSCHRG MGMT >30: CPT

## 2017-12-22 RX ORDER — ACETYLCYSTEINE 200 MG/ML
10 VIAL (ML) MISCELLANEOUS ONCE
Qty: 0 | Refills: 0 | Status: DISCONTINUED | OUTPATIENT
Start: 2017-12-22 | End: 2017-12-22

## 2017-12-22 RX ORDER — FLUTICASONE PROPIONATE AND SALMETEROL 50; 250 UG/1; UG/1
1 POWDER ORAL; RESPIRATORY (INHALATION)
Qty: 1 | Refills: 4 | OUTPATIENT
Start: 2017-12-22 | End: 2018-05-20

## 2017-12-22 RX ORDER — ALBUTEROL 90 UG/1
2 AEROSOL, METERED ORAL
Qty: 1 | Refills: 4 | OUTPATIENT
Start: 2017-12-22 | End: 2018-05-20

## 2017-12-22 RX ORDER — ACETYLCYSTEINE 200 MG/ML
2 VIAL (ML) MISCELLANEOUS ONCE
Qty: 0 | Refills: 0 | Status: COMPLETED | OUTPATIENT
Start: 2017-12-22 | End: 2017-12-22

## 2017-12-22 RX ADMIN — Medication 200 MILLIGRAM(S): at 13:50

## 2017-12-22 RX ADMIN — Medication 3 MILLILITER(S): at 11:05

## 2017-12-22 RX ADMIN — Medication 40 MILLIGRAM(S): at 13:49

## 2017-12-22 RX ADMIN — Medication 40 MILLIGRAM(S): at 05:20

## 2017-12-22 RX ADMIN — INFLUENZA VIRUS VACCINE 0.5 MILLILITER(S): 15; 15; 15; 15 SUSPENSION INTRAMUSCULAR at 14:36

## 2017-12-22 RX ADMIN — Medication 2 MILLILITER(S): at 14:20

## 2017-12-22 NOTE — DISCHARGE NOTE ADULT - MEDICATION SUMMARY - MEDICATIONS TO STOP TAKING
I will STOP taking the medications listed below when I get home from the hospital:    Naprosyn 500 mg oral tablet  -- 1 tab(s) by mouth 2 times a day, take with food  -- Check with your doctor before becoming pregnant.  May cause drowsiness or dizziness.  Obtain medical advice before taking any non-prescription drugs as some may affect the action of this medication.  Take with food or milk.    amoxicillin-clavulanate 875 mg-125 mg oral tablet  -- 1 tab(s) by mouth 2 times a day  -- Finish all this medication unless otherwise directed by prescriber.  Take with food or milk.

## 2017-12-22 NOTE — DISCHARGE NOTE ADULT - PATIENT PORTAL LINK FT
“You can access the [facility] Patient Portal, offered by [facility], by registering with the following website: [email or web address]”

## 2017-12-22 NOTE — PROGRESS NOTE ADULT - SUBJECTIVE AND OBJECTIVE BOX
INTERVAL HPI:  57F with no significant hx expect smoking for 37 years .  Came with SOB and cough along with chills for 4-5 days. 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 Denies fever but have been having hot and cold feelings for last few days.  Did not get flue vaccine.  37 pack year history of smoking, quit 1 week ago.    OVERNIGHT EVENTS:  Some what better but cough persists. RVP positive for entero/Rhino Virus.    Vital Signs Last 24 Hrs  T(C): 37 (22 Dec 2017 10:45), Max: 37 (22 Dec 2017 10:45)  T(F): 98.6 (22 Dec 2017 10:45), Max: 98.6 (22 Dec 2017 10:45)  HR: 79 (22 Dec 2017 12:14) (73 - 100)  BP: 114/69 (22 Dec 2017 10:45) (98/55 - 122/63)  BP(mean): --  RR: 18 (22 Dec 2017 10:45) (16 - 18)  SpO2: 90% (22 Dec 2017 12:14) (90% - 98%)    PHYSICAL EXAM:  GEN:         Awake, responsive and comfortable.  HEENT:    Normal.    RESP:        no wheezing.  CVS:          Regular rate and rhythm.   ABD:         Soft, non-tender, non-distended;     MEDICATIONS  (STANDING):  ALBUTerol/ipratropium for Nebulization 3 milliLiter(s) Nebulizer every 6 hours  influenza   Vaccine 0.5 milliLiter(s) IntraMuscular once  methylPREDNISolone sodium succinate Injectable 40 milliGRAM(s) IV Push every 8 hours    LABS:                        14.9   8.5   )-----------( 238      ( 21 Dec 2017 06:57 )             44.6     12-    140  |  102  |  13  ----------------------------<  164<H>  3.7   |  29  |  0.78    Ca    8.9      21 Dec 2017 06:57  Mg     2.3     12-20    TPro  8.2  /  Alb  3.5  /  TBili  0.7  /  DBili  x   /  AST  22  /  ALT  28  /  AlkPhos  102  12-20    Urinalysis Basic - ( 20 Dec 2017 22:36 )    Color: Yellow / Appearance: Clear / S.020 / pH: x  Gluc: x / Ketone: Small  / Bili: Negative / Urobili: 1 mg/dL   Blood: x / Protein: 30 mg/dL / Nitrite: Negative   Leuk Esterase: Trace / RBC: 3-5 /HPF / WBC 0-2   Sq Epi: x / Non Sq Epi: Few / Bacteria: Moderate    ASSESSMENT AND PLAN:  ·	SOB with wheezing.  ·	Acute COPD exacerbation.  ·	Entero/Rhino virus Tracheobronchitis  ·	Tobacco abuse.    Taper  steroids over 5-7 days.  Bronchodilators as needed.  PFT out pt.

## 2017-12-22 NOTE — CHART NOTE - NSCHARTNOTEFT_GEN_A_CORE
Ms. Ruthann Cooney was discharged from the hospital on 12/22/17 and can return to work on 12/26/17, but should be on light duty until 12/29/17.

## 2017-12-22 NOTE — DISCHARGE NOTE ADULT - CARE PROVIDER_API CALL
pcp,   Phone: (   )    -  Fax: (   )    -    Amol Hernandez (MB[employer]), Medicine  [address] [place] [address]  Phone: [phone]  Fax: [phone]

## 2017-12-22 NOTE — DISCHARGE NOTE ADULT - MEDICATION SUMMARY - MEDICATIONS TO TAKE
I will START or STAY ON the medications listed below when I get home from the hospital:    Deltasone 20 mg oral tablet  -- 2 tab(s) by mouth once a day   -- It is very important that you take or use this exactly as directed.  Do not skip doses or discontinue unless directed by your doctor.  Obtain medical advice before taking any non-prescription drugs as some may affect the action of this medication.  Take with food or milk.    -- Indication: For COPD exacerbation    albuterol 90 mcg/inh inhalation powder  -- 2 puff(s) inhaled every 4 hours as need for shortness of breath   -- For inhalation only.  It is very important that you take or use this exactly as directed.  Do not skip doses or discontinue unless directed by your doctor.  Obtain medical advice before taking any non-prescription drugs as some may affect the action of this medication.    -- Indication: For COPD exacerbation    Advair Diskus 250 mcg-50 mcg inhalation powder  -- 1 puff(s) inhaled 2 times a day   -- Check with your doctor before becoming pregnant.  For inhalation only.  Obtain medical advice before taking any non-prescription drugs as some may affect the action of this medication.  Rinse mouth thoroughly after use.    -- Indication: For COPD exacerbation    guaiFENesin 100 mg/5 mL oral liquid  -- 10 milliliter(s) by mouth every 6 hours, As needed, Cough  -- Indication: For COugh

## 2017-12-22 NOTE — DISCHARGE NOTE ADULT - HOSPITAL COURSE
55 former smoker with likely COPD presents with SOB and cough and c.w copd exacerbation d.t viral illness. Patient was started on steroids and bronchodilators and breathing improved. pt continues to improve and  remains hemodynamically stable. 02 sats are normal with ambulation              Problem/Plan - 1:  ·  Problem: COPD exacerbation.  Plan: steroids/ inhalers  pulmonary follow up   smoking cessation      Problem/Plan - 2:  ·  Problem: Elevated glucose.  Plan: aic. 5.6      Problem/Plan - 3:  ·  Problem: Preventive measure.  Plan: ambulation for dvt.      Problem/Plan - 4:  ·  Problem: Enterovirus infection.  Plan: supportive care.

## 2017-12-22 NOTE — DISCHARGE NOTE ADULT - CARE PLAN
Principal Discharge DX:	COPD exacerbation  Goal:	follow up with pulmonary  Instructions for follow-up, activity and diet:	take inhalers and steroids   smoking cessation

## 2017-12-26 LAB
CULTURE RESULTS: SIGNIFICANT CHANGE UP
CULTURE RESULTS: SIGNIFICANT CHANGE UP
SPECIMEN SOURCE: SIGNIFICANT CHANGE UP
SPECIMEN SOURCE: SIGNIFICANT CHANGE UP

## 2017-12-28 DIAGNOSIS — F17.210 NICOTINE DEPENDENCE, CIGARETTES, UNCOMPLICATED: ICD-10-CM

## 2017-12-28 DIAGNOSIS — B34.1 ENTEROVIRUS INFECTION, UNSPECIFIED: ICD-10-CM

## 2017-12-28 DIAGNOSIS — Z88.0 ALLERGY STATUS TO PENICILLIN: ICD-10-CM

## 2017-12-28 DIAGNOSIS — R73.9 HYPERGLYCEMIA, UNSPECIFIED: ICD-10-CM

## 2017-12-28 DIAGNOSIS — Z72.0 TOBACCO USE: ICD-10-CM

## 2017-12-28 DIAGNOSIS — J44.1 CHRONIC OBSTRUCTIVE PULMONARY DISEASE WITH (ACUTE) EXACERBATION: ICD-10-CM

## 2017-12-28 DIAGNOSIS — Z90.710 ACQUIRED ABSENCE OF BOTH CERVIX AND UTERUS: ICD-10-CM

## 2017-12-28 DIAGNOSIS — J20.8 ACUTE BRONCHITIS DUE TO OTHER SPECIFIED ORGANISMS: ICD-10-CM

## 2018-11-07 NOTE — DISCHARGE NOTE ADULT - NS AS DC AMI YN
"Pt noted to be removing clothing in room, removing bipap and talking to self.  When RN entered room patient noted to have pulled IV out of her arm, had a bowel movement in the bed, and was speaking to \"people\" that were not present in the room.  RN attempted to help patient safely position in the bed, stop the bleeding in her arm where the IV had been pulled out, and clean up after her bowel movement.  Patient got combative with RN and tech.  Patient began shouting, screaming, kicking and attempting to hit staff.  Pt screaming out again to people she was hallucinating as being in the room.  MD on unit, at bedside, security called.  Orders per MD at bedside.  See MAR.    " no

## 2018-12-10 ENCOUNTER — EMERGENCY (EMERGENCY)
Facility: HOSPITAL | Age: 58
LOS: 0 days | Discharge: HOME | End: 2018-12-11
Attending: EMERGENCY MEDICINE
Payer: COMMERCIAL

## 2018-12-10 VITALS
HEIGHT: 66 IN | OXYGEN SATURATION: 96 % | RESPIRATION RATE: 17 BRPM | TEMPERATURE: 99 F | HEART RATE: 85 BPM | DIASTOLIC BLOOD PRESSURE: 71 MMHG | WEIGHT: 119.93 LBS | SYSTOLIC BLOOD PRESSURE: 122 MMHG

## 2018-12-10 DIAGNOSIS — J44.1 CHRONIC OBSTRUCTIVE PULMONARY DISEASE WITH (ACUTE) EXACERBATION: ICD-10-CM

## 2018-12-10 DIAGNOSIS — Z98.89 OTHER SPECIFIED POSTPROCEDURAL STATES: Chronic | ICD-10-CM

## 2018-12-10 DIAGNOSIS — R05 COUGH: ICD-10-CM

## 2018-12-10 DIAGNOSIS — Z90.710 ACQUIRED ABSENCE OF BOTH CERVIX AND UTERUS: Chronic | ICD-10-CM

## 2018-12-10 DIAGNOSIS — Z88.0 ALLERGY STATUS TO PENICILLIN: ICD-10-CM

## 2018-12-10 PROCEDURE — 99285 EMERGENCY DEPT VISIT HI MDM: CPT

## 2018-12-10 NOTE — ED ADULT TRIAGE NOTE - CHIEF COMPLAINT QUOTE
pt c/o cough with clear phlegm,  difficulty breathing, nausea and headache x 3 days.  current everyday smoker

## 2018-12-11 VITALS
DIASTOLIC BLOOD PRESSURE: 58 MMHG | SYSTOLIC BLOOD PRESSURE: 108 MMHG | HEART RATE: 74 BPM | RESPIRATION RATE: 16 BRPM | OXYGEN SATURATION: 93 %

## 2018-12-11 LAB
ALBUMIN SERPL ELPH-MCNC: 3.6 G/DL — SIGNIFICANT CHANGE UP (ref 3.3–5)
ALP SERPL-CCNC: 104 U/L — SIGNIFICANT CHANGE UP (ref 40–120)
ALT FLD-CCNC: 24 U/L — SIGNIFICANT CHANGE UP (ref 12–78)
ANION GAP SERPL CALC-SCNC: 8 MMOL/L — SIGNIFICANT CHANGE UP (ref 5–17)
APTT BLD: 30.8 SEC — SIGNIFICANT CHANGE UP (ref 28.5–37)
AST SERPL-CCNC: 18 U/L — SIGNIFICANT CHANGE UP (ref 15–37)
BILIRUB SERPL-MCNC: 0.4 MG/DL — SIGNIFICANT CHANGE UP (ref 0.2–1.2)
BUN SERPL-MCNC: 11 MG/DL — SIGNIFICANT CHANGE UP (ref 7–23)
CALCIUM SERPL-MCNC: 8.7 MG/DL — SIGNIFICANT CHANGE UP (ref 8.5–10.1)
CHLORIDE SERPL-SCNC: 104 MMOL/L — SIGNIFICANT CHANGE UP (ref 96–108)
CK MB BLD-MCNC: 1.6 % — SIGNIFICANT CHANGE UP (ref 0–3.5)
CK MB CFR SERPL CALC: 2 NG/ML — SIGNIFICANT CHANGE UP (ref 0.5–3.6)
CK SERPL-CCNC: 124 U/L — SIGNIFICANT CHANGE UP (ref 26–192)
CO2 SERPL-SCNC: 29 MMOL/L — SIGNIFICANT CHANGE UP (ref 22–31)
CREAT SERPL-MCNC: 0.69 MG/DL — SIGNIFICANT CHANGE UP (ref 0.5–1.3)
D DIMER BLD IA.RAPID-MCNC: <150 NG/ML DDU — SIGNIFICANT CHANGE UP
GLUCOSE SERPL-MCNC: 109 MG/DL — HIGH (ref 70–99)
HCT VFR BLD CALC: 43.1 % — SIGNIFICANT CHANGE UP (ref 34.5–45)
HGB BLD-MCNC: 14.1 G/DL — SIGNIFICANT CHANGE UP (ref 11.5–15.5)
INR BLD: 1.11 RATIO — SIGNIFICANT CHANGE UP (ref 0.88–1.16)
MCHC RBC-ENTMCNC: 29.1 PG — SIGNIFICANT CHANGE UP (ref 27–34)
MCHC RBC-ENTMCNC: 32.7 GM/DL — SIGNIFICANT CHANGE UP (ref 32–36)
MCV RBC AUTO: 88.9 FL — SIGNIFICANT CHANGE UP (ref 80–100)
NRBC # BLD: 0 /100 WBCS — SIGNIFICANT CHANGE UP (ref 0–0)
PLATELET # BLD AUTO: 318 K/UL — SIGNIFICANT CHANGE UP (ref 150–400)
POTASSIUM SERPL-MCNC: 3.8 MMOL/L — SIGNIFICANT CHANGE UP (ref 3.5–5.3)
POTASSIUM SERPL-SCNC: 3.8 MMOL/L — SIGNIFICANT CHANGE UP (ref 3.5–5.3)
PROT SERPL-MCNC: 7.5 GM/DL — SIGNIFICANT CHANGE UP (ref 6–8.3)
PROTHROM AB SERPL-ACNC: 12.5 SEC — SIGNIFICANT CHANGE UP (ref 10–12.9)
RBC # BLD: 4.85 M/UL — SIGNIFICANT CHANGE UP (ref 3.8–5.2)
RBC # FLD: 12.7 % — SIGNIFICANT CHANGE UP (ref 10.3–14.5)
SODIUM SERPL-SCNC: 141 MMOL/L — SIGNIFICANT CHANGE UP (ref 135–145)
TROPONIN I SERPL-MCNC: <.015 NG/ML — SIGNIFICANT CHANGE UP (ref 0.01–0.04)
WBC # BLD: 9.42 K/UL — SIGNIFICANT CHANGE UP (ref 3.8–10.5)
WBC # FLD AUTO: 9.42 K/UL — SIGNIFICANT CHANGE UP (ref 3.8–10.5)

## 2018-12-11 PROCEDURE — 71045 X-RAY EXAM CHEST 1 VIEW: CPT | Mod: 26

## 2018-12-11 RX ORDER — AZITHROMYCIN 500 MG/1
500 TABLET, FILM COATED ORAL ONCE
Qty: 0 | Refills: 0 | Status: COMPLETED | OUTPATIENT
Start: 2018-12-11 | End: 2018-12-11

## 2018-12-11 RX ORDER — AZITHROMYCIN 500 MG/1
6.5 TABLET, FILM COATED ORAL
Qty: 26 | Refills: 0 | OUTPATIENT
Start: 2018-12-11 | End: 2018-12-14

## 2018-12-11 RX ORDER — ALBUTEROL 90 UG/1
1 AEROSOL, METERED ORAL ONCE
Qty: 0 | Refills: 0 | Status: COMPLETED | OUTPATIENT
Start: 2018-12-11 | End: 2018-12-11

## 2018-12-11 RX ORDER — IPRATROPIUM/ALBUTEROL SULFATE 18-103MCG
3 AEROSOL WITH ADAPTER (GRAM) INHALATION
Qty: 0 | Refills: 0 | Status: COMPLETED | OUTPATIENT
Start: 2018-12-11 | End: 2018-12-11

## 2018-12-11 RX ORDER — ASPIRIN/CALCIUM CARB/MAGNESIUM 324 MG
325 TABLET ORAL ONCE
Qty: 0 | Refills: 0 | Status: COMPLETED | OUTPATIENT
Start: 2018-12-11 | End: 2018-12-11

## 2018-12-11 RX ADMIN — Medication 125 MILLIGRAM(S): at 00:48

## 2018-12-11 RX ADMIN — ALBUTEROL 1 PUFF(S): 90 AEROSOL, METERED ORAL at 03:11

## 2018-12-11 RX ADMIN — Medication 3 MILLILITER(S): at 01:15

## 2018-12-11 RX ADMIN — Medication 3 MILLILITER(S): at 00:48

## 2018-12-11 RX ADMIN — Medication 3 MILLILITER(S): at 01:00

## 2018-12-11 RX ADMIN — Medication 325 MILLIGRAM(S): at 00:48

## 2018-12-11 RX ADMIN — Medication 200 MILLIGRAM(S): at 02:14

## 2018-12-11 RX ADMIN — AZITHROMYCIN 500 MILLIGRAM(S): 500 TABLET, FILM COATED ORAL at 03:20

## 2018-12-11 NOTE — ED ADULT NURSE NOTE - OBJECTIVE STATEMENT
58F aaox4 ambulatory p/w c/o coughing for few days now, current everyday cigarettes smoker, reports cough is productive with whitish phlegm, denies any sick contact, long travels. No chills or fever, sob, or diaphoresis. Complains of chest discomfort and nausea when coughing. Pending to be seen by MD.

## 2018-12-11 NOTE — ED PROVIDER NOTE - NS_EDPROVIDERDISPOUSERTYPE_ED_A_ED
Here for cough and shortness of breath, take inhaler as directed, start abx and prednisone and check cxray r/o infiltrate, quit tobacco and recheck in 1 week  Call if worse or ER for cough and SOB  Attending Attestation (For Attendings USE Only)...

## 2018-12-11 NOTE — ED PROVIDER NOTE - MEDICAL DECISION MAKING DETAILS
Pt with copd exacerbation. dc with inharler, azithro, prednisone. Discussed results and outcome of testing with the patient.  Patient given copy of available results. Patient advised to please follow up with their PMD within the next 24 hours and return to the Emergency Department for worsening symptoms or any other concerns.

## 2018-12-11 NOTE — ED ADULT NURSE NOTE - NSIMPLEMENTINTERV_GEN_ALL_ED
Implemented All Universal Safety Interventions:  Telephone to call system. Call bell, personal items and telephone within reach. Instruct patient to call for assistance. Room bathroom lighting operational. Non-slip footwear when patient is off stretcher. Physically safe environment: no spills, clutter or unnecessary equipment. Stretcher in lowest position, wheels locked, appropriate side rails in place.

## 2018-12-11 NOTE — ED ADULT NURSE NOTE - CHPI ED NUR SYMPTOMS NEG
no wheezing/no shortness of breath/no edema/no fever/no diaphoresis/no hemoptysis/no chills/no body aches

## 2018-12-11 NOTE — ED PROVIDER NOTE - PHYSICAL EXAMINATION
Gen: Alert, Well appearing. NAD    Head: NC, AT, PERRL, EOMI, normal lids/conjunctiva   ENT: Bilateral TM WNL, normal hearing, patent oropharynx without erythema/exudate, uvula midline  Neck: supple, no tenderness/meningismus/JVD   Pulm: diminished on left, diffuse wheeze on rt  CV: RRR, no M/R/G, +dist pulses   Abd: soft, NT/ND, +BS, no guarding/rebound tenderness  Mskel: no edema/erythema/cyanosis   Skin: no rash   Neuro: AAOx3, no sensory/motor deficits, CN 2-12 intact

## 2018-12-11 NOTE — ED PROVIDER NOTE - OBJECTIVE STATEMENT
59yo female with no pertinent pmh, + smoker, "a little COPD", presents with cough with clear sputum, sob, and left sided cp, nonradiating x few days.     ROS: No fever/chills. No photophobia/eye pain/changes in vision, No ear pain/sore throat/dysphagia, + chest pain, no palpitations. + SOB/cough. no abdominal pain, N/V/D, no black/bloody bm. No dysuria/frequency/discharge, No headache. No Dizziness.  No rash.  No numbness/tingling/weakness.

## 2019-03-16 NOTE — DIETITIAN INITIAL EVALUATION ADULT. - OTHER INFO
Wash daily with antibacterial soap, continue at home meds, return to er for recheck on Monday if you cannot see your primary md 56 yo F seen for BMI 16.4. Pt reports she has always been thin & weight fluctuates. Reports since last MD appt in July she has less of a consistent appetite but also has days where intake is normal or greater than normal. Pt states at last MD appt she was told she was "borderline" in regard to diabetes & cholesterol. Pt prepares her own meals. Last BM this am. Reports good appetite @ present, 100% per RN documentation. 56 yo F seen for BMI 16.4, weight rechecked after assessment completed, BMI now WNL per above. Pt reports she has always been thin & weight fluctuates. Reports since last MD appt in July she has less of a consistent appetite but also has days where intake is normal or greater than normal. Pt states at last MD appt she was told she was "borderline" in regard to diabetes & cholesterol. Pt prepares her own meals. Last BM this am. Reports good appetite @ present, 100% per RN documentation.

## 2019-05-05 ENCOUNTER — INPATIENT (INPATIENT)
Facility: HOSPITAL | Age: 59
LOS: 1 days | Discharge: HOME | End: 2019-05-07
Attending: INTERNAL MEDICINE | Admitting: INTERNAL MEDICINE
Payer: COMMERCIAL

## 2019-05-05 VITALS
HEART RATE: 81 BPM | TEMPERATURE: 99 F | RESPIRATION RATE: 18 BRPM | OXYGEN SATURATION: 91 % | HEIGHT: 66 IN | DIASTOLIC BLOOD PRESSURE: 72 MMHG | SYSTOLIC BLOOD PRESSURE: 117 MMHG | WEIGHT: 119.93 LBS

## 2019-05-05 DIAGNOSIS — Z90.710 ACQUIRED ABSENCE OF BOTH CERVIX AND UTERUS: Chronic | ICD-10-CM

## 2019-05-05 DIAGNOSIS — R63.6 UNDERWEIGHT: ICD-10-CM

## 2019-05-05 DIAGNOSIS — J44.1 CHRONIC OBSTRUCTIVE PULMONARY DISEASE WITH (ACUTE) EXACERBATION: ICD-10-CM

## 2019-05-05 DIAGNOSIS — Z98.89 OTHER SPECIFIED POSTPROCEDURAL STATES: Chronic | ICD-10-CM

## 2019-05-05 LAB
ALBUMIN SERPL ELPH-MCNC: 3.7 G/DL — SIGNIFICANT CHANGE UP (ref 3.3–5)
ALP SERPL-CCNC: 115 U/L — SIGNIFICANT CHANGE UP (ref 40–120)
ALT FLD-CCNC: 21 U/L — SIGNIFICANT CHANGE UP (ref 12–78)
ANION GAP SERPL CALC-SCNC: 6 MMOL/L — SIGNIFICANT CHANGE UP (ref 5–17)
AST SERPL-CCNC: 18 U/L — SIGNIFICANT CHANGE UP (ref 15–37)
BASOPHILS # BLD AUTO: 0.07 K/UL — SIGNIFICANT CHANGE UP (ref 0–0.2)
BASOPHILS NFR BLD AUTO: 0.8 % — SIGNIFICANT CHANGE UP (ref 0–2)
BILIRUB SERPL-MCNC: 0.6 MG/DL — SIGNIFICANT CHANGE UP (ref 0.2–1.2)
BUN SERPL-MCNC: 12 MG/DL — SIGNIFICANT CHANGE UP (ref 7–23)
CALCIUM SERPL-MCNC: 9.9 MG/DL — SIGNIFICANT CHANGE UP (ref 8.5–10.1)
CHLORIDE SERPL-SCNC: 101 MMOL/L — SIGNIFICANT CHANGE UP (ref 96–108)
CO2 SERPL-SCNC: 29 MMOL/L — SIGNIFICANT CHANGE UP (ref 22–31)
CREAT SERPL-MCNC: 0.8 MG/DL — SIGNIFICANT CHANGE UP (ref 0.5–1.3)
EOSINOPHIL # BLD AUTO: 0.21 K/UL — SIGNIFICANT CHANGE UP (ref 0–0.5)
EOSINOPHIL NFR BLD AUTO: 2.3 % — SIGNIFICANT CHANGE UP (ref 0–6)
GLUCOSE SERPL-MCNC: 123 MG/DL — HIGH (ref 70–99)
HCT VFR BLD CALC: 47.6 % — HIGH (ref 34.5–45)
HGB BLD-MCNC: 15.8 G/DL — HIGH (ref 11.5–15.5)
IMM GRANULOCYTES NFR BLD AUTO: 0.4 % — SIGNIFICANT CHANGE UP (ref 0–1.5)
LYMPHOCYTES # BLD AUTO: 1.66 K/UL — SIGNIFICANT CHANGE UP (ref 1–3.3)
LYMPHOCYTES # BLD AUTO: 18.3 % — SIGNIFICANT CHANGE UP (ref 13–44)
MCHC RBC-ENTMCNC: 29.4 PG — SIGNIFICANT CHANGE UP (ref 27–34)
MCHC RBC-ENTMCNC: 33.2 GM/DL — SIGNIFICANT CHANGE UP (ref 32–36)
MCV RBC AUTO: 88.6 FL — SIGNIFICANT CHANGE UP (ref 80–100)
MONOCYTES # BLD AUTO: 0.65 K/UL — SIGNIFICANT CHANGE UP (ref 0–0.9)
MONOCYTES NFR BLD AUTO: 7.2 % — SIGNIFICANT CHANGE UP (ref 2–14)
NEUTROPHILS # BLD AUTO: 6.42 K/UL — SIGNIFICANT CHANGE UP (ref 1.8–7.4)
NEUTROPHILS NFR BLD AUTO: 71 % — SIGNIFICANT CHANGE UP (ref 43–77)
NRBC # BLD: 0 /100 WBCS — SIGNIFICANT CHANGE UP (ref 0–0)
PLATELET # BLD AUTO: 249 K/UL — SIGNIFICANT CHANGE UP (ref 150–400)
POTASSIUM SERPL-MCNC: 4 MMOL/L — SIGNIFICANT CHANGE UP (ref 3.5–5.3)
POTASSIUM SERPL-SCNC: 4 MMOL/L — SIGNIFICANT CHANGE UP (ref 3.5–5.3)
PROT SERPL-MCNC: 7.9 GM/DL — SIGNIFICANT CHANGE UP (ref 6–8.3)
RBC # BLD: 5.37 M/UL — HIGH (ref 3.8–5.2)
RBC # FLD: 13.6 % — SIGNIFICANT CHANGE UP (ref 10.3–14.5)
SODIUM SERPL-SCNC: 136 MMOL/L — SIGNIFICANT CHANGE UP (ref 135–145)
WBC # BLD: 9.05 K/UL — SIGNIFICANT CHANGE UP (ref 3.8–10.5)
WBC # FLD AUTO: 9.05 K/UL — SIGNIFICANT CHANGE UP (ref 3.8–10.5)

## 2019-05-05 PROCEDURE — 99285 EMERGENCY DEPT VISIT HI MDM: CPT | Mod: 25

## 2019-05-05 PROCEDURE — 93010 ELECTROCARDIOGRAM REPORT: CPT

## 2019-05-05 PROCEDURE — 71045 X-RAY EXAM CHEST 1 VIEW: CPT | Mod: 26

## 2019-05-05 RX ORDER — AZITHROMYCIN 500 MG/1
500 TABLET, FILM COATED ORAL ONCE
Qty: 0 | Refills: 0 | Status: DISCONTINUED | OUTPATIENT
Start: 2019-05-05 | End: 2019-05-05

## 2019-05-05 RX ORDER — AZITHROMYCIN 500 MG/1
500 TABLET, FILM COATED ORAL ONCE
Qty: 0 | Refills: 0 | Status: COMPLETED | OUTPATIENT
Start: 2019-05-05 | End: 2019-05-05

## 2019-05-05 RX ORDER — IPRATROPIUM/ALBUTEROL SULFATE 18-103MCG
3 AEROSOL WITH ADAPTER (GRAM) INHALATION EVERY 6 HOURS
Qty: 0 | Refills: 0 | Status: DISCONTINUED | OUTPATIENT
Start: 2019-05-05 | End: 2019-05-07

## 2019-05-05 RX ORDER — SODIUM CHLORIDE 9 MG/ML
2000 INJECTION INTRAMUSCULAR; INTRAVENOUS; SUBCUTANEOUS ONCE
Qty: 0 | Refills: 0 | Status: COMPLETED | OUTPATIENT
Start: 2019-05-05 | End: 2019-05-05

## 2019-05-05 RX ORDER — NICOTINE POLACRILEX 2 MG
1 GUM BUCCAL DAILY
Qty: 0 | Refills: 0 | Status: DISCONTINUED | OUTPATIENT
Start: 2019-05-05 | End: 2019-05-07

## 2019-05-05 RX ORDER — IPRATROPIUM/ALBUTEROL SULFATE 18-103MCG
3 AEROSOL WITH ADAPTER (GRAM) INHALATION
Qty: 0 | Refills: 0 | Status: COMPLETED | OUTPATIENT
Start: 2019-05-05 | End: 2019-05-05

## 2019-05-05 RX ADMIN — Medication 3 MILLILITER(S): at 08:00

## 2019-05-05 RX ADMIN — Medication 3 MILLILITER(S): at 07:40

## 2019-05-05 RX ADMIN — Medication 40 MILLIGRAM(S): at 14:56

## 2019-05-05 RX ADMIN — SODIUM CHLORIDE 2000 MILLILITER(S): 9 INJECTION INTRAMUSCULAR; INTRAVENOUS; SUBCUTANEOUS at 09:04

## 2019-05-05 RX ADMIN — Medication 125 MILLIGRAM(S): at 08:07

## 2019-05-05 RX ADMIN — Medication 1 PATCH: at 14:49

## 2019-05-05 RX ADMIN — Medication 3 MILLILITER(S): at 17:01

## 2019-05-05 RX ADMIN — Medication 3 MILLILITER(S): at 23:53

## 2019-05-05 RX ADMIN — Medication 40 MILLIGRAM(S): at 22:25

## 2019-05-05 RX ADMIN — SODIUM CHLORIDE 2000 MILLILITER(S): 9 INJECTION INTRAMUSCULAR; INTRAVENOUS; SUBCUTANEOUS at 08:04

## 2019-05-05 RX ADMIN — Medication 3 MILLILITER(S): at 12:45

## 2019-05-05 RX ADMIN — AZITHROMYCIN 255 MILLIGRAM(S): 500 TABLET, FILM COATED ORAL at 09:44

## 2019-05-05 RX ADMIN — Medication 3 MILLILITER(S): at 08:20

## 2019-05-05 NOTE — H&P ADULT - NSHPLABSRESULTS_GEN_ALL_CORE
CBC Full  -  ( 05 May 2019 08:03 )  WBC Count : 9.05 K/uL  Hemoglobin : 15.8 g/dL  Hematocrit : 47.6 %  Platelet Count - Automated : 249 K/uL  Mean Cell Volume : 88.6 fl  Mean Cell Hemoglobin : 29.4 pg  Mean Cell Hemoglobin Concentration : 33.2 gm/dL  Auto Neutrophil # : 6.42 K/uL  Auto Lymphocyte # : 1.66 K/uL  Auto Monocyte # : 0.65 K/uL  Auto Eosinophil # : 0.21 K/uL  Auto Basophil # : 0.07 K/uL  Auto Neutrophil % : 71.0 %  Auto Lymphocyte % : 18.3 %  Auto Monocyte % : 7.2 %  Auto Eosinophil % : 2.3 %  Auto Basophil % : 0.8 %  05 May 2019 08:03    136    |  101    |  12     ----------------------------<  123    4.0     |  29     |  0.80     Ca    9.9        05 May 2019 08:03    TPro  7.9    /  Alb  3.7    /  TBili  0.6    /  DBili  x      /  AST  18     /  ALT  21     /  AlkPhos  115    05 May 2019 08:03  < from: Xray Chest 1 View- PORTABLE-Routine (05.05.19 @ 08:39) >    Impression    No acute radiographic findings and no change    < end of copied text >

## 2019-05-05 NOTE — ED PROVIDER NOTE - PHYSICAL EXAMINATION
Gen: Alert, + tachypneic  Head: NC, AT, PERRL, EOMI, normal lids/conjunctiva   ENT: Bilateral TM WNL, patent oropharynx without erythema/exudate, uvula midline  Neck: supple, no tenderness/meningismus  Pulm: + diffuse wheeze  CV: RRR, no M/R/G, +dist pulses   Abd: soft, NT/ND, +BS, no guarding/rebound tenderness  Mskel: no edema/erythema/cyanosis   Skin: no rash   Neuro: AAOx3, no sensory/motor deficits, CN 2-12 intact

## 2019-05-05 NOTE — ED ADULT TRIAGE NOTE - CHIEF COMPLAINT QUOTE
coughing, difficulty breathing after I cough and wheezing. Symptoms since Friday. She has a history of COPD and smokes. Last smoked 2 days ago because of the difficulty breathing. Decreased eating but drinking fluids

## 2019-05-05 NOTE — CHART NOTE - NSCHARTNOTEFT_GEN_A_CORE
House- Medicine NP:    Asked by ED physicain Dr. Del Rio to enter bridge orders for Dr. Durham's pt. Per Dr. Quang Durham will be in within 3 hours to admit pt. Patient is a 58y old  Female who presents with a chief complaint of SOB.     T(F): 97.9 (05-05-19 @ 11:30)  HR: 83 (05-05-19 @ 11:30)  BP: 101/60 (05-05-19 @ 11:30)  RR: 17 (05-05-19 @ 11:30)  SpO2: 95% (05-05-19 @ 11:30)    Pt seen in ED in stretcher A + O x4, talking in full sentences. Bridge orders entered. Dr. Durham to see and admit pt.

## 2019-05-05 NOTE — PATIENT PROFILE ADULT - NSPROEDALEARNPREF_GEN_A_NUR
video/audio/computer/internet/group instruction/verbal instruction/skill demonstration/pictorial/individual instruction/written material

## 2019-05-05 NOTE — ED PROVIDER NOTE - CLINICAL SUMMARY MEDICAL DECISION MAKING FREE TEXT BOX
sp 3 nebs, pt still wheezing, and sat 92% though not in extremis. will admit. d/w dr price for admission.

## 2019-05-05 NOTE — ED PROVIDER NOTE - OBJECTIVE STATEMENT
59yo female with pmh COPD (no intubations), presents with cough and sob x 2 days. Pt still smokes. denies fever. denies cp.     ROS: No fever/chills. No photophobia/eye pain/changes in vision, No ear pain/sore throat/dysphagia, No chest pain/palpitations. + SOB/cough, no stridor. No abdominal pain, N/V/D, no black/bloody bm. No dysuria/frequency/discharge, No headache. No Dizziness.  No rash.  No numbness/tingling/weakness.

## 2019-05-05 NOTE — H&P ADULT - HISTORY OF PRESENT ILLNESS
59yo female with pmh COPD (no intubations), presents with cough and sob x 2 days. Pt still smokes. denies fever. denies cp.

## 2019-05-06 LAB
ANION GAP SERPL CALC-SCNC: 6 MMOL/L — SIGNIFICANT CHANGE UP (ref 5–17)
BUN SERPL-MCNC: 16 MG/DL — SIGNIFICANT CHANGE UP (ref 7–23)
CALCIUM SERPL-MCNC: 9.2 MG/DL — SIGNIFICANT CHANGE UP (ref 8.5–10.1)
CHLORIDE SERPL-SCNC: 107 MMOL/L — SIGNIFICANT CHANGE UP (ref 96–108)
CO2 SERPL-SCNC: 27 MMOL/L — SIGNIFICANT CHANGE UP (ref 22–31)
CREAT SERPL-MCNC: 0.65 MG/DL — SIGNIFICANT CHANGE UP (ref 0.5–1.3)
GLUCOSE SERPL-MCNC: 132 MG/DL — HIGH (ref 70–99)
HCT VFR BLD CALC: 44.2 % — SIGNIFICANT CHANGE UP (ref 34.5–45)
HCV AB S/CO SERPL IA: 0.08 S/CO — SIGNIFICANT CHANGE UP (ref 0–0.99)
HCV AB SERPL-IMP: SIGNIFICANT CHANGE UP
HGB BLD-MCNC: 14.5 G/DL — SIGNIFICANT CHANGE UP (ref 11.5–15.5)
MCHC RBC-ENTMCNC: 29 PG — SIGNIFICANT CHANGE UP (ref 27–34)
MCHC RBC-ENTMCNC: 32.8 GM/DL — SIGNIFICANT CHANGE UP (ref 32–36)
MCV RBC AUTO: 88.4 FL — SIGNIFICANT CHANGE UP (ref 80–100)
NRBC # BLD: 0 /100 WBCS — SIGNIFICANT CHANGE UP (ref 0–0)
PLATELET # BLD AUTO: 244 K/UL — SIGNIFICANT CHANGE UP (ref 150–400)
POTASSIUM SERPL-MCNC: 4.6 MMOL/L — SIGNIFICANT CHANGE UP (ref 3.5–5.3)
POTASSIUM SERPL-SCNC: 4.6 MMOL/L — SIGNIFICANT CHANGE UP (ref 3.5–5.3)
RBC # BLD: 5 M/UL — SIGNIFICANT CHANGE UP (ref 3.8–5.2)
RBC # FLD: 13.6 % — SIGNIFICANT CHANGE UP (ref 10.3–14.5)
SODIUM SERPL-SCNC: 140 MMOL/L — SIGNIFICANT CHANGE UP (ref 135–145)
WBC # BLD: 13.68 K/UL — HIGH (ref 3.8–10.5)
WBC # FLD AUTO: 13.68 K/UL — HIGH (ref 3.8–10.5)

## 2019-05-06 RX ORDER — POLYETHYLENE GLYCOL 3350 17 G/17G
17 POWDER, FOR SOLUTION ORAL DAILY
Qty: 0 | Refills: 0 | Status: DISCONTINUED | OUTPATIENT
Start: 2019-05-06 | End: 2019-05-07

## 2019-05-06 RX ADMIN — Medication 3 MILLILITER(S): at 17:33

## 2019-05-06 RX ADMIN — Medication 40 MILLIGRAM(S): at 13:52

## 2019-05-06 RX ADMIN — Medication 1 PATCH: at 06:52

## 2019-05-06 RX ADMIN — Medication 3 MILLILITER(S): at 05:30

## 2019-05-06 RX ADMIN — Medication 1 PATCH: at 09:02

## 2019-05-06 RX ADMIN — Medication 3 MILLILITER(S): at 23:10

## 2019-05-06 RX ADMIN — Medication 40 MILLIGRAM(S): at 21:04

## 2019-05-06 RX ADMIN — Medication 40 MILLIGRAM(S): at 05:46

## 2019-05-06 RX ADMIN — Medication 3 MILLILITER(S): at 11:05

## 2019-05-06 RX ADMIN — Medication 1 PATCH: at 11:05

## 2019-05-06 RX ADMIN — Medication 1 PATCH: at 11:09

## 2019-05-06 NOTE — PROGRESS NOTE ADULT - SUBJECTIVE AND OBJECTIVE BOX
Patient is a 58y old  Female who presents with a chief complaint of     INTERVAL HPI/OVERNIGHT EVENTS:  Patient feels better  MEDICATIONS  (STANDING):  ALBUTerol/ipratropium for Nebulization 3 milliLiter(s) Nebulizer every 6 hours  levoFLOXacin IVPB      levoFLOXacin IVPB 500 milliGRAM(s) IV Intermittent every 24 hours  methylPREDNISolone sodium succinate Injectable 40 milliGRAM(s) IV Push every 8 hours  nicotine -  14 mG/24Hr(s) Patch 1 patch Transdermal daily    MEDICATIONS  (PRN):  polyethylene glycol 3350 17 Gram(s) Oral daily PRN Constipation      Allergies    penicillin (Rash)    Intolerances    Naprosyn (Other)      REVIEW OF SYSTEMS:  CONSTITUTIONAL: No fever, weight loss, or fatigue  EYES: No eye pain, visual disturbances, or discharge  ENMT:  No difficulty hearing, tinnitus, vertigo; No sinus or throat pain  NECK: No pain or stiffness  BREASTS: No pain, masses, or nipple discharge  RESPIRATORY: No cough, wheezing, chills or hemoptysis; No shortness of breath  CARDIOVASCULAR: No chest pain, palpitations, dizziness, or leg swelling  GASTROINTESTINAL: No abdominal or epigastric pain. No nausea, vomiting, or hematemesis; No diarrhea or constipation. No melena or hematochezia.  GENITOURINARY: No dysuria, frequency, hematuria, or incontinence  NEUROLOGICAL: No headaches, memory loss, loss of strength, numbness, or tremors  SKIN: No itching, burning, rashes, or lesions   LYMPH NODES: No enlarged glands  ENDOCRINE: No heat or cold intolerance; No hair loss  MUSCULOSKELETAL: No joint pain or swelling; No muscle, back, or extremity pain  PSYCHIATRIC: No depression, anxiety, mood swings, or difficulty sleeping  HEME/LYMPH: No easy bruising, or bleeding gums  ALLERGY AND IMMUNOLOGIC: No hives or eczema    Vital Signs Last 24 Hrs  T(C): 37 (06 May 2019 16:33), Max: 37 (06 May 2019 16:33)  T(F): 98.6 (06 May 2019 16:33), Max: 98.6 (06 May 2019 16:33)  HR: 94 (06 May 2019 17:34) (61 - 94)  BP: 113/59 (06 May 2019 16:33) (98/55 - 113/63)  BP(mean): --  RR: 18 (06 May 2019 16:33) (18 - 18)  SpO2: 92% (06 May 2019 17:34) (90% - 100%)    PHYSICAL EXAM:  GENERAL: NAD, well-groomed, well-developed  HEAD:  Atraumatic, Normocephalic  EYES: EOMI, PERRLA, conjunctiva and sclera clear  ENMT: No tonsillar erythema, exudates, or enlargement; Moist mucous membranes, Good dentition, No lesions  NECK: Supple, No JVD, Normal thyroid  NERVOUS SYSTEM:  Alert & Oriented X3, Good concentration; Motor Strength 5/5 B/L upper and lower extremities; DTRs 2+ intact and symmetric  CHEST/LUNG: Clear to percussion bilaterally; No rales, rhonchi, wheezing, or rubs  HEART: Regular rate and rhythm; No murmurs, rubs, or gallops  ABDOMEN: Soft, Nontender, Nondistended; Bowel sounds present  EXTREMITIES:  2+ Peripheral Pulses, No clubbing, cyanosis, or edema  LYMPH: No lymphadenopathy noted  SKIN: No rashes or lesions    LABS:                        14.5   13.68 )-----------( 244      ( 06 May 2019 07:55 )             44.2     05-06    140  |  107  |  16  ----------------------------<  132<H>  4.6   |  27  |  0.65    Ca    9.2      06 May 2019 07:55    TPro  7.9  /  Alb  3.7  /  TBili  0.6  /  DBili  x   /  AST  18  /  ALT  21  /  AlkPhos  115  05-05        CAPILLARY BLOOD GLUCOSE        CULTURES:    HEMOGLOBIN A1C:    CHOLESTEROL:        RADIOLOGY & ADDITIONAL TESTS:

## 2019-05-07 ENCOUNTER — TRANSCRIPTION ENCOUNTER (OUTPATIENT)
Age: 59
End: 2019-05-07

## 2019-05-07 VITALS
HEART RATE: 72 BPM | SYSTOLIC BLOOD PRESSURE: 100 MMHG | TEMPERATURE: 98 F | DIASTOLIC BLOOD PRESSURE: 55 MMHG | RESPIRATION RATE: 18 BRPM | OXYGEN SATURATION: 94 %

## 2019-05-07 LAB
ANION GAP SERPL CALC-SCNC: 6 MMOL/L — SIGNIFICANT CHANGE UP (ref 5–17)
BUN SERPL-MCNC: 21 MG/DL — SIGNIFICANT CHANGE UP (ref 7–23)
CALCIUM SERPL-MCNC: 9.2 MG/DL — SIGNIFICANT CHANGE UP (ref 8.5–10.1)
CHLORIDE SERPL-SCNC: 107 MMOL/L — SIGNIFICANT CHANGE UP (ref 96–108)
CO2 SERPL-SCNC: 28 MMOL/L — SIGNIFICANT CHANGE UP (ref 22–31)
CREAT SERPL-MCNC: 0.68 MG/DL — SIGNIFICANT CHANGE UP (ref 0.5–1.3)
GLUCOSE SERPL-MCNC: 114 MG/DL — HIGH (ref 70–99)
HCT VFR BLD CALC: 42.5 % — SIGNIFICANT CHANGE UP (ref 34.5–45)
HGB BLD-MCNC: 13.9 G/DL — SIGNIFICANT CHANGE UP (ref 11.5–15.5)
MCHC RBC-ENTMCNC: 29.2 PG — SIGNIFICANT CHANGE UP (ref 27–34)
MCHC RBC-ENTMCNC: 32.7 GM/DL — SIGNIFICANT CHANGE UP (ref 32–36)
MCV RBC AUTO: 89.3 FL — SIGNIFICANT CHANGE UP (ref 80–100)
NRBC # BLD: 0 /100 WBCS — SIGNIFICANT CHANGE UP (ref 0–0)
PLATELET # BLD AUTO: 267 K/UL — SIGNIFICANT CHANGE UP (ref 150–400)
POTASSIUM SERPL-MCNC: 4.4 MMOL/L — SIGNIFICANT CHANGE UP (ref 3.5–5.3)
POTASSIUM SERPL-SCNC: 4.4 MMOL/L — SIGNIFICANT CHANGE UP (ref 3.5–5.3)
RBC # BLD: 4.76 M/UL — SIGNIFICANT CHANGE UP (ref 3.8–5.2)
RBC # FLD: 13.8 % — SIGNIFICANT CHANGE UP (ref 10.3–14.5)
SODIUM SERPL-SCNC: 141 MMOL/L — SIGNIFICANT CHANGE UP (ref 135–145)
WBC # BLD: 14.99 K/UL — HIGH (ref 3.8–10.5)
WBC # FLD AUTO: 14.99 K/UL — HIGH (ref 3.8–10.5)

## 2019-05-07 RX ORDER — LEVOFLOXACIN 5 MG/ML
1 INJECTION, SOLUTION INTRAVENOUS
Qty: 5 | Refills: 0
Start: 2019-05-07 | End: 2023-03-03

## 2019-05-07 RX ORDER — LEVOFLOXACIN 5 MG/ML
1 INJECTION, SOLUTION INTRAVENOUS
Qty: 5 | Refills: 0
Start: 2019-05-07 | End: 2019-05-11

## 2019-05-07 RX ADMIN — Medication 3 MILLILITER(S): at 11:42

## 2019-05-07 RX ADMIN — Medication 40 MILLIGRAM(S): at 05:32

## 2019-05-07 RX ADMIN — Medication 100 MILLIGRAM(S): at 18:16

## 2019-05-07 RX ADMIN — Medication 1 PATCH: at 11:06

## 2019-05-07 RX ADMIN — Medication 100 MILLIGRAM(S): at 11:07

## 2019-05-07 RX ADMIN — Medication 40 MILLIGRAM(S): at 17:00

## 2019-05-07 RX ADMIN — Medication 1 PATCH: at 06:56

## 2019-05-07 RX ADMIN — Medication 3 MILLILITER(S): at 05:48

## 2019-05-07 RX ADMIN — Medication 1 PATCH: at 11:05

## 2019-05-07 RX ADMIN — Medication 3 MILLILITER(S): at 17:01

## 2019-05-07 NOTE — PROGRESS NOTE ADULT - MINUTES
"Onset abdominal "irritation " x several days. Onset n/v/d  hourly since yesterday. Now her legs hurt. Denies blood inn stool/ emesis.  Feels weak w/ standing  " 30

## 2019-05-07 NOTE — DISCHARGE NOTE PROVIDER - CARE PROVIDER_API CALL
Anam Durham)  [facility] Medicine  [address] [place] [address]  Phone: [phone]  Fax: [phone]  Follow Up Time: 1 week    Amol Hernandez)  Medicine  [address] [facility] [facility] [address]  [place] [address]  Phone: [phone]  Fax: [phone]  Follow Up Time: 1 week

## 2019-05-07 NOTE — DISCHARGE NOTE NURSING/CASE MANAGEMENT/SOCIAL WORK - NSDCPEWEB_GEN_ALL_CORE
[facility] for Tobacco Control website --- [email or web address] website --- [email or web address]

## 2019-05-07 NOTE — DISCHARGE NOTE PROVIDER - PROVIDER TOKENS
PROVIDER:[TOKEN:[[id number]],FOLLOWUP:[1 week]],PROVIDER:[TOKEN:[[id number]],FOLLOWUP:[1 week]]

## 2019-05-07 NOTE — PROGRESS NOTE ADULT - SUBJECTIVE AND OBJECTIVE BOX
Patient is a 58y old  Female who presents with a chief complaint of     INTERVAL HPI/OVERNIGHT EVENTS:  No new complaints.  Stable  MEDICATIONS  (STANDING):  ALBUTerol/ipratropium for Nebulization 3 milliLiter(s) Nebulizer every 6 hours  levoFLOXacin IVPB      levoFLOXacin IVPB 500 milliGRAM(s) IV Intermittent every 24 hours  methylPREDNISolone sodium succinate Injectable 40 milliGRAM(s) IV Push every 12 hours  nicotine -  14 mG/24Hr(s) Patch 1 patch Transdermal daily    MEDICATIONS  (PRN):  guaiFENesin   Syrup  (Sugar-Free) 100 milliGRAM(s) Oral every 6 hours PRN Cough  polyethylene glycol 3350 17 Gram(s) Oral daily PRN Constipation      Allergies    penicillin (Rash)    Intolerances    Naprosyn (Other)      REVIEW OF SYSTEMS:  CONSTITUTIONAL: No fever, weight loss, or fatigue  EYES: No eye pain, visual disturbances, or discharge  ENMT:  No difficulty hearing, tinnitus, vertigo; No sinus or throat pain  NECK: No pain or stiffness  BREASTS: No pain, masses, or nipple discharge  RESPIRATORY: No cough, wheezing, chills or hemoptysis; No shortness of breath  CARDIOVASCULAR: No chest pain, palpitations, dizziness, or leg swelling  GASTROINTESTINAL: No abdominal or epigastric pain. No nausea, vomiting, or hematemesis; No diarrhea or constipation. No melena or hematochezia.  GENITOURINARY: No dysuria, frequency, hematuria, or incontinence  NEUROLOGICAL: No headaches, memory loss, loss of strength, numbness, or tremors  SKIN: No itching, burning, rashes, or lesions   LYMPH NODES: No enlarged glands  ENDOCRINE: No heat or cold intolerance; No hair loss  MUSCULOSKELETAL: No joint pain or swelling; No muscle, back, or extremity pain  PSYCHIATRIC: No depression, anxiety, mood swings, or difficulty sleeping  HEME/LYMPH: No easy bruising, or bleeding gums  ALLERGY AND IMMUNOLOGIC: No hives or eczema    Vital Signs Last 24 Hrs  T(C): 35.9 (07 May 2019 11:56), Max: 37 (06 May 2019 16:33)  T(F): 96.6 (07 May 2019 11:56), Max: 98.6 (06 May 2019 16:33)  HR: 74 (07 May 2019 11:56) (61 - 94)  BP: 113/50 (07 May 2019 11:56) (102/51 - 113/59)  BP(mean): --  RR: 17 (07 May 2019 11:56) (17 - 20)  SpO2: 92% (07 May 2019 11:56) (92% - 100%)    PHYSICAL EXAM:  GENERAL: NAD, well-groomed, well-developed  HEAD:  Atraumatic, Normocephalic  EYES: EOMI, PERRLA, conjunctiva and sclera clear  ENMT: No tonsillar erythema, exudates, or enlargement; Moist mucous membranes, Good dentition, No lesions  NECK: Supple, No JVD, Normal thyroid  NERVOUS SYSTEM:  Alert & Oriented X3, Good concentration; Motor Strength 5/5 B/L upper and lower extremities; DTRs 2+ intact and symmetric  CHEST/LUNG: Clear to percussion bilaterally; No rales, rhonchi, wheezing, or rubs  HEART: Regular rate and rhythm; No murmurs, rubs, or gallops  ABDOMEN: Soft, Nontender, Nondistended; Bowel sounds present  EXTREMITIES:  2+ Peripheral Pulses, No clubbing, cyanosis, or edema  LYMPH: No lymphadenopathy noted  SKIN: No rashes or lesions    LABS:                        13.9   14.99 )-----------( 267      ( 07 May 2019 06:13 )             42.5     05-07    141  |  107  |  21  ----------------------------<  114<H>  4.4   |  28  |  0.68    Ca    9.2      07 May 2019 06:13          CAPILLARY BLOOD GLUCOSE        CULTURES:    HEMOGLOBIN A1C:    CHOLESTEROL:        RADIOLOGY & ADDITIONAL TESTS:

## 2019-05-07 NOTE — CHART NOTE - NSCHARTNOTEFT_GEN_A_CORE
To Whom It May Concerns,     The patient Ruthann Cooney was admitted to the hospital 5/5/2019 and discharged 5/7/2019. Patient may return back to work with no restrictions. Any questions please refer to patient's PMD Dr Durham.      [facility]

## 2019-05-07 NOTE — DISCHARGE NOTE PROVIDER - NSDCCPCAREPLAN_GEN_ALL_CORE_FT
PRINCIPAL DISCHARGE DIAGNOSIS  Diagnosis: COPD exacerbation  Assessment and Plan of Treatment: Avoid future exacerbations  Follow up with pulmonlogist  Continue meds as prescribed

## 2019-05-07 NOTE — DISCHARGE NOTE NURSING/CASE MANAGEMENT/SOCIAL WORK - NSDCDPATPORTLINK_GEN_ALL_CORE
You can access the [facility] Patient Portal, offered by [facility], by registering with the following website: [email or web address]

## 2019-05-07 NOTE — CONSULT NOTE ADULT - SUBJECTIVE AND OBJECTIVE BOX
Patient is a 58y old  Female who presents with a chief complaint of SOB    HPI:  59yo female with Active tobacco abuse since age 19, suspected  COPD (no intubations), presents with cough and sob x 2 days along with chills and body aches.  Pt still smokes. denies fever. denies cp.     PAST MEDICAL & SURGICAL HISTORY:  Smoker  COPD (chronic obstructive pulmonary disease)  H/O total hysterectomy  H/O:     FAMILY HISTORY:  No pertinent family history in first degree relatives    SOCIAL HISTORY: active smoker    Allergies  penicillin (Rash)    MEDICATIONS  (STANDING):  ALBUTerol/ipratropium for Nebulization 3 milliLiter(s) Nebulizer every 6 hours  levoFLOXacin IVPB      levoFLOXacin IVPB 500 milliGRAM(s) IV Intermittent every 24 hours  methylPREDNISolone sodium succinate Injectable 40 milliGRAM(s) IV Push every 12 hours  nicotine -  14 mG/24Hr(s) Patch 1 patch Transdermal daily    MEDICATIONS  (PRN):  guaiFENesin   Syrup  (Sugar-Free) 100 milliGRAM(s) Oral every 6 hours PRN Cough  polyethylene glycol 3350 17 Gram(s) Oral daily PRN Constipation    REVIEW OF SYSTEMS:    Constitutional:            No fever, weight loss or fatigue  HEENT:                         No difficulty hearing, tinnitus, vertigo; No sinus or throat pain  Respiratory:                sob, productive coygh  Cardiovascular:           No chest pain, palpitations  Gastrointestinal:        No abdominal or epigastric pain. No N/V/diarrhea or hematemesis  Genitourinary:            No dysuria, frequency, hematuria or incontinence  SKIN:                             no rash  Musculoskeletal:        No joint pain or swelling  Extremities:                No swelling  Neurological:              No headaches  Psychiatric:                 No depression, anxiety    Vital Signs Last 24 Hrs  T(C): 35.9 (07 May 2019 11:56), Max: 37 (06 May 2019 16:33)  T(F): 96.6 (07 May 2019 11:56), Max: 98.6 (06 May 2019 16:33)  HR: 74 (07 May 2019 11:56) (61 - 94)  BP: 113/50 (07 May 2019 11:56) (102/51 - 113/59)  BP(mean): --  RR: 17 (07 May 2019 11:56) (17 - 20)  SpO2: 92% (07 May 2019 11:56) (92% - 100%)    PHYSICAL EXAM:  GEN:         Awake, responsive and comfortable.  HEENT:    Normal.    RESP:        decreased air entry.  CVS:           Regular rate and rhythm.   ABD:         Soft, non-tender, non-distended;   :             No costovertebral angle tenderness  SKIN:           Warm and dry.  EXTR:            No clubbing, cyanosis or edema  CNS:              Intact sensory and motor function.  PSYCH:        cooperative, no anxiety or depression    LABS:                        13.9   14.99 )-----------( 267      ( 07 May 2019 06:13 )             42.5         141  |  107  |  21  ----------------------------<  114<H>  4.4   |  28  |  0.68    Ca    9.2      07 May 2019 06:13    RADIOLOGY & ADDITIONAL STUDIES:  < from: Xray Chest 1 View- PORTABLE-Routine (19 @ 08:39) >    EXAM:  XR CHEST PORTABLE ROUTINE 1V                          PROCEDURE DATE:  2019      INTERPRETATION:  Clinical history: 58-year-old female, coughing.    Two views of the chest are compared to 2018 and demonstrate a   normal cardiac silhouette and normal pulmonary vasculature with no   consolidation, effusion, gross adenopathy, pneumothorax or osseous   finding.    Impression    No acute radiographic findings and no change    CODY ALAN M.D., ATTENDING RADIOLOGIST  This document has been electronically signed. May  5 2019 12:31PM      ASSESSMENT AND PLAN:  ·	SOB.  ·	Acute COPD exacerbation.  ·	Leukocytosis.  ·	Tobacco abuse.    Continue steroids, antibiotics and nebulizer.  Smoking cessation.  PFT out pt.

## 2019-05-07 NOTE — DISCHARGE NOTE NURSING/CASE MANAGEMENT/SOCIAL WORK - NSDCPEEMAIL_GEN_ALL_CORE
[facility] for Tobacco Control email [email or web address]

## 2019-05-07 NOTE — DISCHARGE NOTE PROVIDER - HOSPITAL COURSE
59yo female with pmh COPD (no intubations), presents with cough and sob x 2 days. Treated with steroids and IV antibiotics. Patient symptoms controlled, medically optimized, and stable for discharge. Patient to follow up with PMD, pulmonologist.

## 2019-05-07 NOTE — PROGRESS NOTE ADULT - PROBLEM SELECTOR PLAN 1
Bronchodilators  Antibiotics  Solumedrol.  Continue current treatment  O2 supplementation.  Stable   discharge home.

## 2019-05-08 RX ORDER — ALBUTEROL 90 UG/1
2 AEROSOL, METERED ORAL
Qty: 2 | Refills: 0
Start: 2019-05-08 | End: 2019-06-06

## 2019-05-14 DIAGNOSIS — E43 UNSPECIFIED SEVERE PROTEIN-CALORIE MALNUTRITION: ICD-10-CM

## 2019-05-14 DIAGNOSIS — J44.1 CHRONIC OBSTRUCTIVE PULMONARY DISEASE WITH (ACUTE) EXACERBATION: ICD-10-CM

## 2019-05-14 DIAGNOSIS — Z88.0 ALLERGY STATUS TO PENICILLIN: ICD-10-CM

## 2019-05-14 DIAGNOSIS — F17.210 NICOTINE DEPENDENCE, CIGARETTES, UNCOMPLICATED: ICD-10-CM

## 2019-11-07 NOTE — ED ADULT NURSE NOTE - NS ED NURSE RECORD ANOTHER VITAL SIGN
Ceasar Hsu Patient Age: 4 year old  MESSAGE: please rosalee this patient as complex.  If he sees me he will need 40 minutes.       WEIGHT AND HEIGHT:   Wt Readings from Last 1 Encounters:   11/07/19 (!) 24.8 kg (54 lb 9.6 oz) (97 %, Z= 1.90)*     * Growth percentiles are based on CDC (Boys, 2-20 Years) data.     Ht Readings from Last 1 Encounters:   11/07/19 3' 11\" (1.194 m) (98 %, Z= 2.12)*     * Growth percentiles are based on CDC (Boys, 2-20 Years) data.     BMI Readings from Last 1 Encounters:   11/07/19 17.38 kg/m² (91 %, Z= 1.35)*     * Growth percentiles are based on CDC (Boys, 2-20 Years) data.       ALLERGIES: no known allergies.  Current Outpatient Medications   Medication   • albuterol (VENTOLIN) (2.5 MG/3ML) 0.083% nebulizer solution     No current facility-administered medications for this visit.      PHARMACY to use:           Pharmacy preference(s) on file:   [id number] - [place], [place] - [address] AT [facility] [address]  [address]  [facility] [address]  Phone: [phone] Fax: [phone]      CALL BACK INFO: Ok to leave response (including medical information) with family member or on answering machine  ROUTING: Patient's physician/staff        PCP: Yaquelin Gallagher MD         INS: Payor: [employer]UE [employer] [place] / Plan: PPO [id number] / Product Type: PPO MISC   PATIENT ADDRESS:  [address] [place] [id number]     Yes

## 2021-06-15 NOTE — ED ADULT TRIAGE NOTE - NS ED TRIAGE AVPU SCALE
After instilling alcohol to drain and letting it sit for 1 hour while patient repositioned every 15 min, the bulb would not hold suction when reconnected. On further investigation it was found to have a cracked hub. Will replace drain. Patient does not want any sedation for procedure.    Alert-The patient is alert, awake and responds to voice. The patient is oriented to time, place, and person. The triage nurse is able to obtain subjective information.

## 2021-10-11 NOTE — ED ADULT NURSE NOTE - CHPI ED SYMPTOMS POS
Patient was given a course of antibiotics and steroids in the form of doxycycline and prednisone and if patient is still symptomatic then patient needs to go to see the PCP locally or go to the ER if has increased symptoms
Pt calling to state she still having a persisted cough and would like if you can prescribe a medication for it.  Please abvice
Pt informed of Dr. Mena Proffer response below and verbalized understanding.
SHORTNESS OF BREATH/COUGH

## 2022-08-03 NOTE — ED ADULT NURSE NOTE - NSFALLRSKUNASSIST_ED_ALL_ED
56M nad aaox3 c/o left flank pain nausea and vomiting onset lat night and diarrhea 2 days pta, denies cp, denies sob no

## 2022-12-21 ENCOUNTER — EMERGENCY (EMERGENCY)
Facility: HOSPITAL | Age: 62
LOS: 0 days | Discharge: HOME | End: 2022-12-21
Attending: STUDENT IN AN ORGANIZED HEALTH CARE EDUCATION/TRAINING PROGRAM
Payer: COMMERCIAL

## 2022-12-21 VITALS
HEART RATE: 88 BPM | DIASTOLIC BLOOD PRESSURE: 65 MMHG | RESPIRATION RATE: 17 BRPM | TEMPERATURE: 99 F | SYSTOLIC BLOOD PRESSURE: 117 MMHG | OXYGEN SATURATION: 95 %

## 2022-12-21 VITALS
RESPIRATION RATE: 18 BRPM | SYSTOLIC BLOOD PRESSURE: 118 MMHG | TEMPERATURE: 101 F | WEIGHT: 119.93 LBS | OXYGEN SATURATION: 95 % | HEIGHT: 66 IN | DIASTOLIC BLOOD PRESSURE: 61 MMHG | HEART RATE: 83 BPM

## 2022-12-21 DIAGNOSIS — Z90.710 ACQUIRED ABSENCE OF BOTH CERVIX AND UTERUS: ICD-10-CM

## 2022-12-21 DIAGNOSIS — J39.8 OTHER SPECIFIED DISEASES OF UPPER RESPIRATORY TRACT: ICD-10-CM

## 2022-12-21 DIAGNOSIS — Z87.891 PERSONAL HISTORY OF NICOTINE DEPENDENCE: ICD-10-CM

## 2022-12-21 DIAGNOSIS — J44.1 CHRONIC OBSTRUCTIVE PULMONARY DISEASE WITH (ACUTE) EXACERBATION: ICD-10-CM

## 2022-12-21 DIAGNOSIS — Z88.8 ALLERGY STATUS TO OTHER DRUGS, MEDICAMENTS AND BIOLOGICAL SUBSTANCES STATUS: ICD-10-CM

## 2022-12-21 DIAGNOSIS — R50.9 FEVER, UNSPECIFIED: ICD-10-CM

## 2022-12-21 DIAGNOSIS — Z98.89 OTHER SPECIFIED POSTPROCEDURAL STATES: Chronic | ICD-10-CM

## 2022-12-21 DIAGNOSIS — Z88.0 ALLERGY STATUS TO PENICILLIN: ICD-10-CM

## 2022-12-21 DIAGNOSIS — R11.0 NAUSEA: ICD-10-CM

## 2022-12-21 DIAGNOSIS — R06.02 SHORTNESS OF BREATH: ICD-10-CM

## 2022-12-21 DIAGNOSIS — Z20.822 CONTACT WITH AND (SUSPECTED) EXPOSURE TO COVID-19: ICD-10-CM

## 2022-12-21 DIAGNOSIS — Z90.710 ACQUIRED ABSENCE OF BOTH CERVIX AND UTERUS: Chronic | ICD-10-CM

## 2022-12-21 PROBLEM — J44.9 CHRONIC OBSTRUCTIVE PULMONARY DISEASE, UNSPECIFIED: Chronic | Status: ACTIVE | Noted: 2019-05-05

## 2022-12-21 PROBLEM — F17.200 NICOTINE DEPENDENCE, UNSPECIFIED, UNCOMPLICATED: Chronic | Status: ACTIVE | Noted: 2019-05-05

## 2022-12-21 LAB
ALBUMIN SERPL ELPH-MCNC: 3.5 G/DL — SIGNIFICANT CHANGE UP (ref 3.3–5)
ALP SERPL-CCNC: 97 U/L — SIGNIFICANT CHANGE UP (ref 40–120)
ALT FLD-CCNC: 23 U/L — SIGNIFICANT CHANGE UP (ref 12–78)
ANION GAP SERPL CALC-SCNC: 6 MMOL/L — SIGNIFICANT CHANGE UP (ref 5–17)
AST SERPL-CCNC: 18 U/L — SIGNIFICANT CHANGE UP (ref 15–37)
BASOPHILS # BLD AUTO: 0.03 K/UL — SIGNIFICANT CHANGE UP (ref 0–0.2)
BASOPHILS NFR BLD AUTO: 0.5 % — SIGNIFICANT CHANGE UP (ref 0–2)
BILIRUB SERPL-MCNC: 0.4 MG/DL — SIGNIFICANT CHANGE UP (ref 0.2–1.2)
BUN SERPL-MCNC: 12 MG/DL — SIGNIFICANT CHANGE UP (ref 7–23)
CALCIUM SERPL-MCNC: 9.4 MG/DL — SIGNIFICANT CHANGE UP (ref 8.5–10.1)
CHLORIDE SERPL-SCNC: 102 MMOL/L — SIGNIFICANT CHANGE UP (ref 96–108)
CO2 SERPL-SCNC: 29 MMOL/L — SIGNIFICANT CHANGE UP (ref 22–31)
CREAT SERPL-MCNC: 0.78 MG/DL — SIGNIFICANT CHANGE UP (ref 0.5–1.3)
EGFR: 85 ML/MIN/1.73M2 — SIGNIFICANT CHANGE UP
EOSINOPHIL # BLD AUTO: 0.02 K/UL — SIGNIFICANT CHANGE UP (ref 0–0.5)
EOSINOPHIL NFR BLD AUTO: 0.3 % — SIGNIFICANT CHANGE UP (ref 0–6)
GLUCOSE SERPL-MCNC: 100 MG/DL — HIGH (ref 70–99)
HCT VFR BLD CALC: 44.4 % — SIGNIFICANT CHANGE UP (ref 34.5–45)
HGB BLD-MCNC: 14.7 G/DL — SIGNIFICANT CHANGE UP (ref 11.5–15.5)
IMM GRANULOCYTES NFR BLD AUTO: 0.2 % — SIGNIFICANT CHANGE UP (ref 0–0.9)
LYMPHOCYTES # BLD AUTO: 1.07 K/UL — SIGNIFICANT CHANGE UP (ref 1–3.3)
LYMPHOCYTES # BLD AUTO: 18 % — SIGNIFICANT CHANGE UP (ref 13–44)
MAGNESIUM SERPL-MCNC: 2.2 MG/DL — SIGNIFICANT CHANGE UP (ref 1.6–2.6)
MCHC RBC-ENTMCNC: 28.9 PG — SIGNIFICANT CHANGE UP (ref 27–34)
MCHC RBC-ENTMCNC: 33.1 G/DL — SIGNIFICANT CHANGE UP (ref 32–36)
MCV RBC AUTO: 87.2 FL — SIGNIFICANT CHANGE UP (ref 80–100)
MONOCYTES # BLD AUTO: 0.49 K/UL — SIGNIFICANT CHANGE UP (ref 0–0.9)
MONOCYTES NFR BLD AUTO: 8.2 % — SIGNIFICANT CHANGE UP (ref 2–14)
NEUTROPHILS # BLD AUTO: 4.33 K/UL — SIGNIFICANT CHANGE UP (ref 1.8–7.4)
NEUTROPHILS NFR BLD AUTO: 72.8 % — SIGNIFICANT CHANGE UP (ref 43–77)
NRBC # BLD: 0 /100 WBCS — SIGNIFICANT CHANGE UP (ref 0–0)
PLATELET # BLD AUTO: 164 K/UL — SIGNIFICANT CHANGE UP (ref 150–400)
POTASSIUM SERPL-MCNC: 4.6 MMOL/L — SIGNIFICANT CHANGE UP (ref 3.5–5.3)
POTASSIUM SERPL-SCNC: 4.6 MMOL/L — SIGNIFICANT CHANGE UP (ref 3.5–5.3)
PROT SERPL-MCNC: 7.7 GM/DL — SIGNIFICANT CHANGE UP (ref 6–8.3)
RAPID RVP RESULT: SIGNIFICANT CHANGE UP
RBC # BLD: 5.09 M/UL — SIGNIFICANT CHANGE UP (ref 3.8–5.2)
RBC # FLD: 13.5 % — SIGNIFICANT CHANGE UP (ref 10.3–14.5)
SARS-COV-2 RNA SPEC QL NAA+PROBE: SIGNIFICANT CHANGE UP
SODIUM SERPL-SCNC: 137 MMOL/L — SIGNIFICANT CHANGE UP (ref 135–145)
WBC # BLD: 5.95 K/UL — SIGNIFICANT CHANGE UP (ref 3.8–10.5)
WBC # FLD AUTO: 5.95 K/UL — SIGNIFICANT CHANGE UP (ref 3.8–10.5)

## 2022-12-21 PROCEDURE — 71045 X-RAY EXAM CHEST 1 VIEW: CPT | Mod: 26

## 2022-12-21 PROCEDURE — 99284 EMERGENCY DEPT VISIT MOD MDM: CPT

## 2022-12-21 RX ORDER — ACETAMINOPHEN 500 MG
1000 TABLET ORAL ONCE
Refills: 0 | Status: COMPLETED | OUTPATIENT
Start: 2022-12-21 | End: 2022-12-21

## 2022-12-21 RX ORDER — TIOTROPIUM BROMIDE 18 UG/1
2 CAPSULE ORAL; RESPIRATORY (INHALATION) ONCE
Refills: 0 | Status: COMPLETED | OUTPATIENT
Start: 2022-12-21 | End: 2022-12-21

## 2022-12-21 RX ORDER — ALBUTEROL 90 UG/1
4 AEROSOL, METERED ORAL ONCE
Refills: 0 | Status: COMPLETED | OUTPATIENT
Start: 2022-12-21 | End: 2022-12-21

## 2022-12-21 RX ORDER — ALBUTEROL 90 UG/1
4 AEROSOL, METERED ORAL ONCE
Refills: 0 | Status: COMPLETED | OUTPATIENT
Start: 2022-12-21 | End: 2023-11-19

## 2022-12-21 RX ORDER — SODIUM CHLORIDE 9 MG/ML
500 INJECTION INTRAMUSCULAR; INTRAVENOUS; SUBCUTANEOUS ONCE
Refills: 0 | Status: COMPLETED | OUTPATIENT
Start: 2022-12-21 | End: 2022-12-21

## 2022-12-21 RX ORDER — TIOTROPIUM BROMIDE 18 UG/1
2 CAPSULE ORAL; RESPIRATORY (INHALATION) ONCE
Refills: 0 | Status: COMPLETED | OUTPATIENT
Start: 2022-12-21 | End: 2023-11-19

## 2022-12-21 RX ORDER — ALBUTEROL 90 UG/1
2 AEROSOL, METERED ORAL
Qty: 1 | Refills: 0
Start: 2022-12-21

## 2022-12-21 RX ADMIN — TIOTROPIUM BROMIDE 2 PUFF(S): 18 CAPSULE ORAL; RESPIRATORY (INHALATION) at 16:05

## 2022-12-21 RX ADMIN — SODIUM CHLORIDE 500 MILLILITER(S): 9 INJECTION INTRAMUSCULAR; INTRAVENOUS; SUBCUTANEOUS at 14:57

## 2022-12-21 RX ADMIN — ALBUTEROL 4 PUFF(S): 90 AEROSOL, METERED ORAL at 17:05

## 2022-12-21 RX ADMIN — SODIUM CHLORIDE 500 MILLILITER(S): 9 INJECTION INTRAMUSCULAR; INTRAVENOUS; SUBCUTANEOUS at 18:30

## 2022-12-21 RX ADMIN — Medication 1000 MILLIGRAM(S): at 18:30

## 2022-12-21 RX ADMIN — ALBUTEROL 4 PUFF(S): 90 AEROSOL, METERED ORAL at 16:05

## 2022-12-21 RX ADMIN — Medication 400 MILLIGRAM(S): at 14:57

## 2022-12-21 RX ADMIN — Medication 40 MILLIGRAM(S): at 15:28

## 2022-12-21 RX ADMIN — Medication 1000 MILLIGRAM(S): at 16:15

## 2022-12-21 RX ADMIN — SODIUM CHLORIDE 500 MILLILITER(S): 9 INJECTION INTRAMUSCULAR; INTRAVENOUS; SUBCUTANEOUS at 17:05

## 2022-12-21 NOTE — ED PROVIDER NOTE - PROGRESS NOTE DETAILS
RENUKA Stephens NP: Pt reassessed.  Breathing more comfortably.  Plan to give one more albuterol tx, IVFs, and po challenge. RENUKA Stephens NP:  Pt reports feeling improvement. Tolerating po.  Plan d/w pt.  Will send inhaler and steroid prescription to pharmacy.  All questions answered.  pt is stable and ready for d/c.

## 2022-12-21 NOTE — ED PROVIDER NOTE - CLINICAL SUMMARY MEDICAL DECISION MAKING FREE TEXT BOX
62 y.o. female w/ pmhx of COPD and pshx of GENIE presents to the ED w/ c/o low grade fever, chills, and SOB x 1 week. rhonchi w/ expiratory wheezing auscultated b/l w/ low grade temp.  Suspect symptoms 2/2 viral illness possibly contributing to COPD exacerbation.  Low suspicion for ACS given no chest pain, few report risk factors, and presentation not consistent w/ ACS.  Plan to obtain labs for fluid/electrolyte eval, cxr to r/o PNA, and RVP.

## 2022-12-21 NOTE — ED PROVIDER NOTE - PATIENT PORTAL LINK FT
You can access the FollowMyHealth Patient Portal offered by [facility] by registering at the following website: [email or web address]. By joining [facility]’s FollowMyHealth portal, you will also be able to view your health information using other applications (apps) compatible with our system.

## 2022-12-21 NOTE — ED ADULT NURSE NOTE - OBJECTIVE STATEMENT
patient is A&Ox4. PMH COPD, smoker. PSH , hysterectomy. patient complaining of sob, subjective fever, chills and productive cough x about 2 weeks. reports worsening sob with exertion. complaining of worsening cough x one week. reports being seen in urgent care 3 days ago and tested positive for the flu. complaining of decreased appetite x 2 days. states she was prescribed a "flu medication" that she didn't take. reports worsening symptoms. denies any cp, abdominal pain, dirrhea, vomiting, urinary symptoms, difficulty breathing.

## 2022-12-21 NOTE — ED PROVIDER NOTE - ATTENDING APP SHARED VISIT CONTRIBUTION OF CARE
61yo female with pmh copd presenting with cough, chills, sob and wheezing.  Ongoing over past week, reportedly flu + at  after she was having cough and fever but now with some sob.  No cp.  Well appearing here in no resp distress but with expiratory wheezing.  No edema, low suspicion pe/ acs.  Likely viral syndrome/ copd exacerbation.  Will get screening labs, XR, medicate and reassess..

## 2022-12-21 NOTE — ED PROVIDER NOTE - PHYSICAL EXAMINATION
GEN: Pt in NAD, A&O x4  HEAD: Head NCAT, Neck supple FROM.  EYES: Sclera white w/o injection.   ENT: No auricular ttp.  No nasal d/c. MMM. uvula midline, no oral lesions or tonsillar enlargement.  RESP: +crackles, +rhonchi b/l  CARDIAC: RRR, clear distinct S1, S2, no appreciable murmurs.  ABD: Abdomen soft, non-tender. No CVAT b/l.  VASC: 2+ radial pulses b/l.  SKIN: No rashes on the trunk.

## 2022-12-21 NOTE — ED PROVIDER NOTE - NS ED ATTENDING STATEMENT MOD
This was a shared visit with the [facility]. I reviewed and verified the documentation and independently performed the documented:

## 2022-12-21 NOTE — ED PROVIDER NOTE - OBJECTIVE STATEMENT
62 y.o. female w/ pmhx of COPD and pshx of GENIE presents to the ED w/ c/o low grade fever, chills, and SOB x 1 week.  Cough started couple of weeks ago but worsened and became productive this past week.  Also endorsing nausea and decreased appetite, last ate 2 days ago.  Was seen at urgent care 3 days ago and dx w/ flu.  Pt states she was prescribed a "flu medicine" but it was too expensive and she did not take it.  Has not been taking anything for treatment.  Reports feeling worse and now dizzy.  No chest pain, abdominal pain, dysuria, LOC, hemoptysis, or diarrhea.  Pt stopped smoking 2 weeks ago.

## 2022-12-21 NOTE — ED PROVIDER NOTE - NSFOLLOWUPINSTRUCTIONS_ED_ALL_ED_FT
You were seen in the emergency department for viral symptoms.   Please read all attached patient information, read all additional instructions below, and follow-up with all providers as directed.    1) Follow-up with your primary care provider in 1-2 days.    2) Continue to take all medications as prescribed.    3) Rest and stay hydrated. Pain can be managed with Acetaminophen (aka Tylenol) and Ibuprofen (aka Motrin or Advil) over the counter as directed.    4) Return to the ER for any new or worsening symptoms.      Please read all attached patient information.    Chronic Obstructive Pulmonary Disease Exacerbation       Chronic obstructive pulmonary disease (COPD) is a long-term (chronic) condition that affects the lungs. COPD is a general term that can be used to describe many different lung problems that cause lung inflammation and limit airflow, including chronic bronchitis and emphysema. COPD exacerbations are episodes when breathing symptoms flare up, become much worse, and require extra treatment.    COPD exacerbations are usually caused by infections. Without treatment, COPD exacerbations can be severe and even life threatening. Frequent COPD exacerbations can cause further damage to the lungs.      What are the causes?    This condition may be caused by:  •Respiratory infections, including viral and bacterial infections.      •Exposure to smoke.      •Exposure to air pollution, chemical fumes, or dust.      •Things that can cause an allergic reaction (allergens).      •Not taking your usual COPD medicines as directed.      •Underlying medical problems, such as congestive heart failure or infections not involving the lungs.      In many cases, the cause of this condition is not known.      What increases the risk?    The following factors may make you more likely to develop this condition:  •Smoking cigarettes.      •Being an older adult.      •Having frequent prior COPD exacerbations.        What are the signs or symptoms?    Symptoms of this condition include:  •Increased coughing.      •Increased production of mucus from your lungs.      •Increased wheezing and shortness of breath.      •Rapid or labored breathing.      •Chest tightness.      •Less energy than usual.      •Sleep disruption from symptoms.      •Confusion      •Increased sleepiness.      Often, these symptoms happen or get worse even with the use of medicines.      How is this diagnosed?    This condition is diagnosed based on:  •Your medical history.      •A physical exam.      You may also have tests, including:  •A chest X-ray.      •Blood tests.      •Lung (pulmonary) function tests.        How is this treated?    Treatment for this condition depends on the severity and cause of the symptoms. You may need to be admitted to a hospital for treatment. Some of the treatments commonly used to treat COPD exacerbations are:  •Antibiotic medicines. These may be used for severe exacerbations caused by a lung infection, such as pneumonia.      •Bronchodilators. These are inhaled medicines that expand the air passages and allow increased airflow. They may make your breathing more comfortable.      •Steroid medicines. These act to reduce inflammation in the airways. They may be given with an inhaler, taken by mouth, or given through an IV tube inserted into one of your veins.      •Supplemental oxygen therapy.      •Airway clearing techniques, such as noninvasive ventilation (NIV) and positive expiratory pressure (PEP). These provide respiratory support through a mask or other noninvasive device. An example of this would be using a continuous positive airway pressure (CPAP) machine to improve delivery of oxygen into your lungs.        Follow these instructions at home:    Medicines     •Take over-the-counter and prescription medicines only as told by your health care provider.      •It is important to use correct technique with inhaled medicines.      •If you were prescribed an antibiotic medicine or oral steroid, take it as told by your health care provider. Do not stop taking the medicine even if you start to feel better.      Lifestyle     • Do not use any products that contain nicotine or tobacco. These products include cigarettes, chewing tobacco, and vaping devices, such as e-cigarettes. If you need help quitting, ask your health care provider.      •Eat a healthy diet.      •Exercise regularly.      •Get enough sleep. Most adults need 7 or more hours per night.      •Avoid exposure to all substances that irritate the airway, especially tobacco smoke.      •Regularly wash your hands with soap and water for at least 20 seconds. If soap and water are not available, use hand . This may help prevent you from getting infections.      •During flu season, avoid enclosed spaces that are crowded with people.      General instructions     •Drink enough fluid to keep your urine pale yellow, unless you have a medical condition that requires fluid restriction.      •Use a cool mist vaporizer. This humidifies the air and makes it easier for you to clear your chest when you cough.      •If you have a home nebulizer and oxygen, continue to use them as told by your health care provider.      •Keep all follow-up visits. This is important.        How is this prevented?    •Stay up-to-date on pneumococcal and flu (influenza) vaccines. A flu shot is recommended every year to help prevent exacerbations.      •Quitting smoking is very important in preventing COPD from getting worse and in preventing exacerbations from happening as often.      •Follow all instructions for pulmonary rehabilitation after a recent exacerbation. This can help prevent future exacerbations.      •Work with your health care provider to develop and follow an action plan. This tells you what steps to take when you experience certain symptoms.        Contact a health care provider if:    •You have a worsening of your regular COPD symptoms.        Get help right away if:    •You have worsening shortness of breath, even when resting.      •You have trouble talking.      •You have severe chest pain.      •You cough up blood.      •You have a fever.      •You have weakness, vomit repeatedly, or faint.      •You feel confused.      •You are not able to sleep because of your symptoms.      •You have trouble doing daily activities.      These symptoms may represent a serious problem that is an emergency. Do not wait to see if the symptoms will go away. Get medical help right away. Call your local emergency services (911 in the [place] Do not drive yourself to the hospital.       Summary    •COPD exacerbations are episodes when breathing symptoms become much worse and require extra treatment above your normal treatment.      •Exacerbations can be severe and even life threatening. Frequent COPD exacerbations can cause further damage to your lungs.      •COPD exacerbations are usually triggered by infections such as the flu, colds, and even pneumonia.      •Treatment for this condition depends on the severity and cause of the symptoms. You may need to be admitted to a hospital for treatment.      •Quitting smoking is very important to prevent COPD from getting worse and to prevent exacerbations from happening as often.      This information is not intended to replace advice given to you by your health care provider. Make sure you discuss any questions you have with your health care provider.

## 2022-12-21 NOTE — ED PROVIDER NOTE - CARE PLAN
1 Principal Discharge DX:	COPD with acute exacerbation  Secondary Diagnosis:	Viral upper respiratory illness

## 2023-02-27 ENCOUNTER — EMERGENCY (EMERGENCY)
Facility: HOSPITAL | Age: 63
LOS: 0 days | Discharge: HOME | End: 2023-02-27
Attending: STUDENT IN AN ORGANIZED HEALTH CARE EDUCATION/TRAINING PROGRAM
Payer: SELF-PAY

## 2023-02-27 VITALS
TEMPERATURE: 98 F | DIASTOLIC BLOOD PRESSURE: 79 MMHG | SYSTOLIC BLOOD PRESSURE: 124 MMHG | HEART RATE: 83 BPM | OXYGEN SATURATION: 98 % | WEIGHT: 119.93 LBS | RESPIRATION RATE: 18 BRPM | HEIGHT: 66 IN

## 2023-02-27 VITALS
HEART RATE: 76 BPM | TEMPERATURE: 99 F | SYSTOLIC BLOOD PRESSURE: 102 MMHG | DIASTOLIC BLOOD PRESSURE: 65 MMHG | OXYGEN SATURATION: 96 % | RESPIRATION RATE: 17 BRPM

## 2023-02-27 DIAGNOSIS — R06.02 SHORTNESS OF BREATH: ICD-10-CM

## 2023-02-27 DIAGNOSIS — Z88.0 ALLERGY STATUS TO PENICILLIN: ICD-10-CM

## 2023-02-27 DIAGNOSIS — R05.9 COUGH, UNSPECIFIED: ICD-10-CM

## 2023-02-27 DIAGNOSIS — R06.2 WHEEZING: ICD-10-CM

## 2023-02-27 DIAGNOSIS — Z88.6 ALLERGY STATUS TO ANALGESIC AGENT: ICD-10-CM

## 2023-02-27 DIAGNOSIS — F17.200 NICOTINE DEPENDENCE, UNSPECIFIED, UNCOMPLICATED: ICD-10-CM

## 2023-02-27 DIAGNOSIS — Z20.822 CONTACT WITH AND (SUSPECTED) EXPOSURE TO COVID-19: ICD-10-CM

## 2023-02-27 DIAGNOSIS — Z90.710 ACQUIRED ABSENCE OF BOTH CERVIX AND UTERUS: ICD-10-CM

## 2023-02-27 DIAGNOSIS — Z98.89 OTHER SPECIFIED POSTPROCEDURAL STATES: Chronic | ICD-10-CM

## 2023-02-27 DIAGNOSIS — J44.1 CHRONIC OBSTRUCTIVE PULMONARY DISEASE WITH (ACUTE) EXACERBATION: ICD-10-CM

## 2023-02-27 DIAGNOSIS — Z90.710 ACQUIRED ABSENCE OF BOTH CERVIX AND UTERUS: Chronic | ICD-10-CM

## 2023-02-27 LAB
ALBUMIN SERPL ELPH-MCNC: 3.2 G/DL — LOW (ref 3.3–5)
ALP SERPL-CCNC: 106 U/L — SIGNIFICANT CHANGE UP (ref 40–120)
ALT FLD-CCNC: 23 U/L — SIGNIFICANT CHANGE UP (ref 12–78)
ANION GAP SERPL CALC-SCNC: 7 MMOL/L — SIGNIFICANT CHANGE UP (ref 5–17)
APTT BLD: 27.3 SEC — LOW (ref 27.5–35.5)
AST SERPL-CCNC: 19 U/L — SIGNIFICANT CHANGE UP (ref 15–37)
BASOPHILS # BLD AUTO: 0.04 K/UL — SIGNIFICANT CHANGE UP (ref 0–0.2)
BASOPHILS NFR BLD AUTO: 0.3 % — SIGNIFICANT CHANGE UP (ref 0–2)
BILIRUB SERPL-MCNC: 0.3 MG/DL — SIGNIFICANT CHANGE UP (ref 0.2–1.2)
BUN SERPL-MCNC: 11 MG/DL — SIGNIFICANT CHANGE UP (ref 7–23)
CALCIUM SERPL-MCNC: 9.2 MG/DL — SIGNIFICANT CHANGE UP (ref 8.5–10.1)
CHLORIDE SERPL-SCNC: 100 MMOL/L — SIGNIFICANT CHANGE UP (ref 96–108)
CO2 SERPL-SCNC: 31 MMOL/L — SIGNIFICANT CHANGE UP (ref 22–31)
CREAT SERPL-MCNC: 0.73 MG/DL — SIGNIFICANT CHANGE UP (ref 0.5–1.3)
EGFR: 92 ML/MIN/1.73M2 — SIGNIFICANT CHANGE UP
EOSINOPHIL # BLD AUTO: 0.16 K/UL — SIGNIFICANT CHANGE UP (ref 0–0.5)
EOSINOPHIL NFR BLD AUTO: 1.4 % — SIGNIFICANT CHANGE UP (ref 0–6)
GLUCOSE SERPL-MCNC: 119 MG/DL — HIGH (ref 70–99)
HCT VFR BLD CALC: 43.6 % — SIGNIFICANT CHANGE UP (ref 34.5–45)
HGB BLD-MCNC: 13.9 G/DL — SIGNIFICANT CHANGE UP (ref 11.5–15.5)
IMM GRANULOCYTES NFR BLD AUTO: 0.3 % — SIGNIFICANT CHANGE UP (ref 0–0.9)
INR BLD: 1.12 RATIO — SIGNIFICANT CHANGE UP (ref 0.88–1.16)
LYMPHOCYTES # BLD AUTO: 1.59 K/UL — SIGNIFICANT CHANGE UP (ref 1–3.3)
LYMPHOCYTES # BLD AUTO: 13.7 % — SIGNIFICANT CHANGE UP (ref 13–44)
MCHC RBC-ENTMCNC: 28 PG — SIGNIFICANT CHANGE UP (ref 27–34)
MCHC RBC-ENTMCNC: 31.9 G/DL — LOW (ref 32–36)
MCV RBC AUTO: 87.7 FL — SIGNIFICANT CHANGE UP (ref 80–100)
MONOCYTES # BLD AUTO: 0.87 K/UL — SIGNIFICANT CHANGE UP (ref 0–0.9)
MONOCYTES NFR BLD AUTO: 7.5 % — SIGNIFICANT CHANGE UP (ref 2–14)
NEUTROPHILS # BLD AUTO: 8.93 K/UL — HIGH (ref 1.8–7.4)
NEUTROPHILS NFR BLD AUTO: 76.8 % — SIGNIFICANT CHANGE UP (ref 43–77)
NRBC # BLD: 0 /100 WBCS — SIGNIFICANT CHANGE UP (ref 0–0)
NT-PROBNP SERPL-SCNC: 70 PG/ML — SIGNIFICANT CHANGE UP (ref 0–125)
PLATELET # BLD AUTO: 328 K/UL — SIGNIFICANT CHANGE UP (ref 150–400)
POTASSIUM SERPL-MCNC: 4.8 MMOL/L — SIGNIFICANT CHANGE UP (ref 3.5–5.3)
POTASSIUM SERPL-SCNC: 4.8 MMOL/L — SIGNIFICANT CHANGE UP (ref 3.5–5.3)
PROT SERPL-MCNC: 7.9 GM/DL — SIGNIFICANT CHANGE UP (ref 6–8.3)
PROTHROM AB SERPL-ACNC: 13.3 SEC — SIGNIFICANT CHANGE UP (ref 10.5–13.4)
RAPID RVP RESULT: DETECTED
RBC # BLD: 4.97 M/UL — SIGNIFICANT CHANGE UP (ref 3.8–5.2)
RBC # FLD: 13.8 % — SIGNIFICANT CHANGE UP (ref 10.3–14.5)
RV+EV RNA SPEC QL NAA+PROBE: DETECTED
SARS-COV-2 RNA SPEC QL NAA+PROBE: SIGNIFICANT CHANGE UP
SODIUM SERPL-SCNC: 138 MMOL/L — SIGNIFICANT CHANGE UP (ref 135–145)
TROPONIN I, HIGH SENSITIVITY RESULT: 5.1 NG/L — SIGNIFICANT CHANGE UP
WBC # BLD: 11.63 K/UL — HIGH (ref 3.8–10.5)
WBC # FLD AUTO: 11.63 K/UL — HIGH (ref 3.8–10.5)

## 2023-02-27 PROCEDURE — 93010 ELECTROCARDIOGRAM REPORT: CPT

## 2023-02-27 PROCEDURE — 99053 MED SERV 10PM-8AM 24 HR FAC: CPT

## 2023-02-27 PROCEDURE — 71045 X-RAY EXAM CHEST 1 VIEW: CPT | Mod: 26

## 2023-02-27 PROCEDURE — 99285 EMERGENCY DEPT VISIT HI MDM: CPT

## 2023-02-27 RX ORDER — AZITHROMYCIN 500 MG/1
500 TABLET, FILM COATED ORAL ONCE
Refills: 0 | Status: DISCONTINUED | OUTPATIENT
Start: 2023-02-27 | End: 2023-02-27

## 2023-02-27 RX ORDER — IPRATROPIUM/ALBUTEROL SULFATE 18-103MCG
3 AEROSOL WITH ADAPTER (GRAM) INHALATION ONCE
Refills: 0 | Status: COMPLETED | OUTPATIENT
Start: 2023-02-27 | End: 2023-02-27

## 2023-02-27 RX ORDER — ALBUTEROL 90 UG/1
2 AEROSOL, METERED ORAL ONCE
Refills: 0 | Status: COMPLETED | OUTPATIENT
Start: 2023-02-27 | End: 2023-02-27

## 2023-02-27 RX ADMIN — Medication 125 MILLIGRAM(S): at 02:56

## 2023-02-27 RX ADMIN — Medication 3 MILLILITER(S): at 02:57

## 2023-02-27 RX ADMIN — ALBUTEROL 2 PUFF(S): 90 AEROSOL, METERED ORAL at 06:33

## 2023-02-27 RX ADMIN — Medication 3 MILLILITER(S): at 03:20

## 2023-02-27 NOTE — ED ADULT NURSE NOTE - CAS EDN DISCHARGE ASSESSMENT
NAD noted at this time/Alert and oriented to person, place and time/Patient baseline mental status/Awake

## 2023-02-27 NOTE — ED PROVIDER NOTE - CLINICAL SUMMARY MEDICAL DECISION MAKING FREE TEXT BOX
Pt PMHx of current smoker, COPD; presents with symptoms most consistent w/ an acute COPD exacerbation. The likely precipitant is acute respiratory infection, weather change, air quality, recent betablocker use, recent opioid use. Low suspicion for alternate etiologies such as pneumothorax, acute pulmonary embolism, ACS/NSTEMI, CHF, or cardiac effusion.  - Pseudomonas risk factors: recent hospitalizations, frequent antibiotic treatment, severe COPD, previously isolated pseudomonas.   - Maintain oxygen saturations 90-94% with supplemental oxygen PRN. BIPAP PRN worsening work of breathing.  - Trial of duonebs x 3 with solumedrol 125mg IVP, Serial re-assessments. Continuous albuterol PRN.  - Antibiotics are indicated given purulent sputum, increased sputum production, trial of BIPAP.   - CXR to evaluate for other acute cardiopulmonary processes, CBC, CMP, EKG  - Re-evaluate and disposition accordingly. Pt PMHx of current smoker, COPD; presents with symptoms most consistent w/ an acute COPD exacerbation. The likely precipitant is acute respiratory infection, weather change, air quality, recent betablocker use, recent opioid use. Low suspicion for alternate etiologies such as pneumothorax, acute pulmonary embolism, ACS/NSTEMI, CHF, or cardiac effusion.  - Pseudomonas risk factors: recent hospitalizations, frequent antibiotic treatment, severe COPD, previously isolated pseudomonas.   - Maintain oxygen saturations 90-94% with supplemental oxygen PRN. BIPAP PRN worsening work of breathing.  - Trial of duonebs x 3 with solumedrol 125mg IVP, Serial re-assessments. Continuous albuterol PRN.  - Antibiotics are indicated given purulent sputum, increased sputum production, trial of BIPAP.   - CXR to evaluate for other acute cardiopulmonary processes, CBC, CMP, EKG  - Re-evaluate and disposition accordingly.    On discharge evaluation, patient with good air entry, speaking full sentences, improved wheezing, saturating 99% on RA, no exertional dyspnea, no chest pain, well appearing. Rx prednisone/albuterol and f/u pmd.

## 2023-02-27 NOTE — ED PROVIDER NOTE - PHYSICAL EXAMINATION
VITAL SIGNS: I have reviewed nursing notes and confirm.   GEN: Well-developed; well-nourished; in no acute distress. Speaking full sentences.  SKIN: Warm, pink, no rash, no diaphoresis, no cyanosis, well perfused.   HEAD: Normocephalic; atraumatic. No scalp lacerations, no abrasions.  NECK: Supple; non tender.   EYES: Pupils 3mm equal, round, reactive to light and accomodation, conjunctiva and sclera clear. Extra-ocular movements intact bilaterally.  ENT: No nasal discharge; airway clear. Trachea is midline. ORAL: No oropharyngeal exudates or erythema. Normal dentition.  CV: Regular rate and rhythm. S1, S2 normal; no murmurs, gallops, or rubs. No lower extremity pitting edema bilaterally. Capillary refill < 2 seconds throughout. Distal pulses intact 2+ throughout.  RESP:  (+) mild expiratory wheezing throughout, good air entry. No retractions.  ABD: Normal bowel sounds, soft, non-distended, non-tender, no rebound, no guarding, no rigidity, no hepatosplenomegaly. No CVA tenderness bilaterally.  MSK: Normal range of motion and movement of all 4 extremities. No joint or muscular pain throughout. No clubbing.   BACK: No thoracolumbar midline or paravertebral tenderness. No step-offs or obvious deformities.  NEURO: Alert & oriented x 3, Grossly unremarkable. Sensory and motor intact throughout. No focal deficits. Gait: Fluid. Normal speech and coordination.   PSYCH: Cooperative, appropriate.

## 2023-02-27 NOTE — ED PROVIDER NOTE - OBJECTIVE STATEMENT
62F PMHx of current tobacco smoker, COPD (no intubations, no hospitalizations, no ED visits), presenting with coughing and shortness of breath x few weeks. Describes mild clear productive sputum cough a/w wheezing mild. Denies any chest pain, abdominal pain,   nausea/vomiting, headaches, fevers, chills, diarrhea ,constipation, weakness, syncope, hematuria, dysuria, urinary symptoms, subjective neurological deficits.

## 2023-02-27 NOTE — ED ADULT NURSE NOTE - OBJECTIVE STATEMENT
Pt AOx4 and ambulatory with steady gait. Pt c/o wet productive cough that she has had for a few weeks. Pt reports she smokes cigarettes but has cut down a lot. Wheezes noted over b/l lung sounds. Pt denies use of pain medication or use of blood thinners. Pt denies SOB, fever/chills, dizziness, headache, blurred vision, chest pain, N/V/D, or dysuria.

## 2023-02-27 NOTE — ED ADULT TRIAGE NOTE - CHIEF COMPLAINT QUOTE
Pt Aox4, ambulatory, here for cough with mucous for couple weeks a/w mild difficulty breathing.  Denies chest pain, n/v/d.  speaking in full sentences  no pmxh, allergy to penicillin

## 2023-02-27 NOTE — ED ADULT NURSE NOTE - SUICIDE SCREENING QUESTION 2
Pt insists on only seeing  for her physical is there anywhere you can fit her  In. I tried to offer next available or with a resident and pt declined   No

## 2023-02-27 NOTE — ED ADULT TRIAGE NOTE - BMI (KG/M2)
Daily Note     Today's date: 3/29/2021  Patient name: Evy August Sr   : 10/11/1925  MRN: [id number]  Referring provider: Christiano Cordova  Dx:   Encounter Diagnosis     ICD-10-CM    1  Contusion of left shoulder, subsequent encounter  S40 012D    2  Rotator cuff arthropathy of left shoulder  M12 812    3  Left shoulder pain, unspecified chronicity  M25 512    4  Posture abnormality  R29 3                   Subjective: The patient denies feeling any pain at his left shoulder - He continues to have difficulty moving the arm through certain motions      Objective: See treatment diary below      Assessment: The patient feels his ROM and strength at the left arm are progressing with therapy  The patient was able to tolerate his exercises and/activities in therapy without complaining of increased left shoulder pain  The patient is able to maintain his left shoulder in elevation for alternating isometrics  The patient does have difficulty bearing weight through the left shoulder to assist with sit to stand transfers  The patient will benefit from continued PT to achieve his functional goals of therapy  Plan: Continue per plan of care  Progress treatment as tolerated         Precautions: fall risk  Progress note: 4/10  POC: 6/10    Manuals 3/24 3/29  3/18 3/22   Stretching with active release x10 min x10 mins  x10 min    GH jt mobs                        Neuro Re-Ed        UBE Seated 120/30 3min fwd   3 min back Seated 120/30  - 6 mins alternate  Seated   120/30 x3 min Seated 120/30 3min fwd   3 min back   scap retraction Seated x20 Seated x20  Seated x20 Seated x20   Shoulder rolls Seated x20 Seated x20  Seated x20 Seated x20   Chin tucks x10 x10  x10 X10   Table rocks to tolerance *x10 fwd/ back  Stand x15 ea   *   Alternating isometrics IR/ER at side; 90 deg flexion  Gentle 4x:15 ea      MTP/LTP        Shoulder isometrics *x10 sitting x10 sitting  Flex,abd, IR, ER      Ther Ex        Pulleys :10x10 10x:10  :10x10 10x:10   UT stretch :30x3   :30x4 :30X3   LS stretch :30x3   :30x4 :30X3   Table slides Flex, scap 10x:05 Flex/scap 10x:05  Flex, scap :05x10 Flex, scap 10x:05   pendulums        Supine flexion ROM Cane x10 x10 cane  AAROM x10 AAROM X10   Supine ER ROM Cane x10 Cane x10  :05x10 CANE 10X:05   Side lying shoulder ER  *NV   *   Serratus punch        Standing YTI        Wall walks Wall ladder x10 flex Wall ladder x3  Wall ladder x10 flex Wall ladder x10 flex   Cane AAROM Seated flex x10 Seated x10  seated x10  Flex, abd Seated flex x10                           Ther Activity                        Gait Training                        Modalities                        Access Code: [email or web address]   URL: [email or web address]   Date: 03/10/2021; updated 3/16/21   Prepared by: Jag Rodriguez     Exercises  Supine Shoulder Flexion AAROM with Hands Clasped - 10 reps - 1 sets - 5 sec hold - 2x daily - 7x weekly  Supine Shoulder External Rotation with Dowel - 10 reps - 1 sets - 5 sec hold - 2x daily - 7x weekly  Seated Shoulder Flexion Towel Slide at Table Top - 10 reps - 1 sets - 5 sec hold - 2x daily - 7x weekly  Seated Shoulder Abduction Towel Slide at Table Top - 10 reps - 1 sets - 5 sec hold - 2x daily - 7x weekly  Chin tucks   levator stretch  Upper trap stretch  Shoulder isometric 19.4

## 2023-02-27 NOTE — ED PROVIDER NOTE - NSFOLLOWUPINSTRUCTIONS_ED_ALL_ED_FT
Rest, drink plenty of fluids.  Advance activity as tolerated.  Continue all previously prescribed medications as directed.  Follow up with your PMD 2-3 days and bring copies of your results.  Return to the ER for worsening symptoms, fevers, chills, chest pain, shortness of breath, or new concerning symptoms.    Take acetaminophen 650 mg orally every 6-8 hours for pain control as needed. Please do not exceed 4,000 mg of acetaminophen during a 24 hours period. Acetaminophen can be found in many over-the-counter cold medications as well as opioid medications that may be given for pain.    Take ibuprofen (also known as MOTRIN or ADVIL) 400 mg orally every 6-8 hours for pain control as needed with food to avoid an upset stomach. Ibuprofen can be found in many over-the-counter medications. Please do not take ibuprofen if you have a bleeding disorder, stomach or gastrointestinal ulcer, or liver disease.    If needed, you can alternate these medications so that you can take one medication every 3 hours. For example, at noon take ibuprofen, then at 3PM take acetaminophen, then at 6PM take ibuprofen.     Rest, drink plenty of fluids.  Advance activity as tolerated.  Continue all previously prescribed medications as directed.  Follow up with your PMD 2-3 days and bring copies of your results.  Return to the ER for worsening symptoms,

## 2023-02-27 NOTE — ED ADULT NURSE NOTE - NSIMPLEMENTINTERV_GEN_ALL_ED
2 seconds or less Implemented All Universal Safety Interventions:  [place] to call system. Call bell, personal items and telephone within reach. Instruct patient to call for assistance. Room bathroom lighting operational. Non-slip footwear when patient is off stretcher. Physically safe environment: no spills, clutter or unnecessary equipment. Stretcher in lowest position, wheels locked, appropriate side rails in place.

## 2023-02-27 NOTE — ED PROVIDER NOTE - CCCP TRG CHIEF CMPLNT
cough Island Pedicle Flap With Canthal Suspension Text: The defect edges were debeveled with a #15 scalpel blade.  Given the location of the defect, shape of the defect and the proximity to free margins an island pedicle advancement flap was deemed most appropriate.  Using a sterile surgical marker, an appropriate advancement flap was drawn incorporating the defect, outlining the appropriate donor tissue and placing the expected incisions within the relaxed skin tension lines where possible. The area thus outlined was incised deep to adipose tissue with a #15 scalpel blade.  The skin margins were undermined to an appropriate distance in all directions around the primary defect and laterally outward around the island pedicle utilizing iris scissors.  There was minimal undermining beneath the pedicle flap. A suspension suture was placed in the canthal tendon to prevent tension and prevent ectropion.

## 2023-02-27 NOTE — ED PROVIDER NOTE - PATIENT PORTAL LINK FT
You can access the FollowMyHealth Patient Portal offered by [facility] by registering at the following website: [email or web address]. By joining [employer]’s FollowMyHealth portal, you will also be able to view your health information using other applications (apps) compatible with our system.

## 2023-03-03 ENCOUNTER — EMERGENCY (EMERGENCY)
Facility: HOSPITAL | Age: 63
LOS: 0 days | Discharge: HOME | End: 2023-03-03
Attending: EMERGENCY MEDICINE
Payer: SELF-PAY

## 2023-03-03 VITALS
HEART RATE: 84 BPM | SYSTOLIC BLOOD PRESSURE: 120 MMHG | RESPIRATION RATE: 23 BRPM | OXYGEN SATURATION: 94 % | WEIGHT: 117.95 LBS | TEMPERATURE: 98 F | DIASTOLIC BLOOD PRESSURE: 75 MMHG | HEIGHT: 66 IN

## 2023-03-03 VITALS
SYSTOLIC BLOOD PRESSURE: 119 MMHG | HEART RATE: 80 BPM | OXYGEN SATURATION: 95 % | TEMPERATURE: 98 F | RESPIRATION RATE: 20 BRPM | DIASTOLIC BLOOD PRESSURE: 70 MMHG

## 2023-03-03 DIAGNOSIS — Z90.710 ACQUIRED ABSENCE OF BOTH CERVIX AND UTERUS: Chronic | ICD-10-CM

## 2023-03-03 DIAGNOSIS — Z88.6 ALLERGY STATUS TO ANALGESIC AGENT: ICD-10-CM

## 2023-03-03 DIAGNOSIS — F17.200 NICOTINE DEPENDENCE, UNSPECIFIED, UNCOMPLICATED: ICD-10-CM

## 2023-03-03 DIAGNOSIS — J44.9 CHRONIC OBSTRUCTIVE PULMONARY DISEASE, UNSPECIFIED: ICD-10-CM

## 2023-03-03 DIAGNOSIS — Z98.89 OTHER SPECIFIED POSTPROCEDURAL STATES: Chronic | ICD-10-CM

## 2023-03-03 DIAGNOSIS — Z88.0 ALLERGY STATUS TO PENICILLIN: ICD-10-CM

## 2023-03-03 DIAGNOSIS — Z90.710 ACQUIRED ABSENCE OF BOTH CERVIX AND UTERUS: ICD-10-CM

## 2023-03-03 DIAGNOSIS — R05.9 COUGH, UNSPECIFIED: ICD-10-CM

## 2023-03-03 DIAGNOSIS — R06.02 SHORTNESS OF BREATH: ICD-10-CM

## 2023-03-03 PROCEDURE — 99284 EMERGENCY DEPT VISIT MOD MDM: CPT

## 2023-03-03 PROCEDURE — 93010 ELECTROCARDIOGRAM REPORT: CPT

## 2023-03-03 RX ORDER — ALBUTEROL 90 UG/1
1 AEROSOL, METERED ORAL ONCE
Refills: 0 | Status: COMPLETED | OUTPATIENT
Start: 2023-03-03 | End: 2023-03-03

## 2023-03-03 RX ORDER — AZITHROMYCIN 500 MG/1
1 TABLET, FILM COATED ORAL
Qty: 6 | Refills: 0
Start: 2023-03-03 | End: 2023-03-07

## 2023-03-03 RX ORDER — AZITHROMYCIN 500 MG/1
500 TABLET, FILM COATED ORAL ONCE
Refills: 0 | Status: COMPLETED | OUTPATIENT
Start: 2023-03-03 | End: 2023-03-03

## 2023-03-03 RX ADMIN — ALBUTEROL 1 PUFF(S): 90 AEROSOL, METERED ORAL at 23:14

## 2023-03-03 RX ADMIN — Medication 100 MILLIGRAM(S): at 22:35

## 2023-03-03 RX ADMIN — AZITHROMYCIN 500 MILLIGRAM(S): 500 TABLET, FILM COATED ORAL at 22:35

## 2023-03-03 NOTE — ED PROVIDER NOTE - CONSTITUTIONAL, MLM
Well appearing, awake, alert, oriented to person, place, time/situation and in no apparent distress. [Negative] : Heme/Lymph normal...

## 2023-03-03 NOTE — ED PROVIDER NOTE - CARE PROVIDER_API CALL
Amol Hernandez)  Medicine  [address], [address]  [place] [address]  Phone: [phone]  Fax: [phone]  Follow Up Time: 1-3 Days

## 2023-03-03 NOTE — ED ADULT NURSE NOTE - CHIEF COMPLAINT QUOTE
complaining of productive cough shortness of breath, and runny nose seen here on Monday (+) RVP, still on prednisone and inhaler no relief. catching breath when talking. denies fever, cp

## 2023-03-03 NOTE — ED ADULT NURSE NOTE - NSIMPLEMENTINTERV_GEN_ALL_ED
Implemented All Universal Safety Interventions:  [place] to call system. Call bell, personal items and telephone within reach. Instruct patient to call for assistance. Room bathroom lighting operational. Non-slip footwear when patient is off stretcher. Physically safe environment: no spills, clutter or unnecessary equipment. Stretcher in lowest position, wheels locked, appropriate side rails in place.

## 2023-03-03 NOTE — ED PROVIDER NOTE - PATIENT PORTAL LINK FT
You can access the FollowMyHealth Patient Portal offered by [facility] by registering at the following website: [email or web address]. By joining [employer]’s FollowMyHealth portal, you will also be able to view your health information using other applications (apps) compatible with our system.

## 2023-03-03 NOTE — ED ADULT NURSE NOTE - NSSUHOSCREENINGYN_ED_ALL_ED
CHIEF COMPLAINT:     Follow-up from laparoscopic cholecystectomy     HISTORY OF PRESENT ILLNESS:    Mechelle Del Real is a 34 y.o. female who underwent laparoscopic cholecystectomy with intraoperative cholangiogram on 2/7/2019 for cholelithiasis. Since discharge from the hospital,  she has returned to work.  Postoperative pain has resolved.  There is no nausea or vomiting.  There have been no problems with bowel movements.  She is tolerating a regular diet. There are no urinary complaints.      EXAM:     Alert. Oriented.  Lungs: Clear.  Heart: Regular.  Abdomen: Soft.  Nondistended.  Incisions are healing.  There is no cellulitis.  Extremities: Warm.     I reviewed the pathology report with the patient in the office today.  It showed moderate to marked chronic cholecystitis with cholelithiasis and cholesterolosis.  I reviewed the images and the report from the cholangiogram.  It was normal.     ASSESSMENT:     Status post laparoscopic cystectomy with cholangiogram on 2/7/2019 for moderate to marked chronic cholecystitis with cholelithiasis and cholesterolosis.     PLAN:     Doing well.  We discussed diet and activity.    Follow-up as needed.    
Yes - the patient is able to be screened

## 2023-03-03 NOTE — ED PROVIDER NOTE - CLINICAL SUMMARY MEDICAL DECISION MAKING FREE TEXT BOX
pt with cough and some intermittent wheezing that improves with albuterol - concerned that she is running out of meds -otherwise well appearing without any distress otherwise discussed that lungs are clear on exam - pt asking for additional meds for cough will dc with azithromycin and tessalon perles for cough and will dc with pulm follow up.

## 2023-03-03 NOTE — ED ADULT TRIAGE NOTE - CHIEF COMPLAINT QUOTE
complaining of productive cough shortness of breath, and runny nose seen here on Monday (+) RVP, still on prednisone and inhaler no relief. denies fever, cp complaining of productive cough shortness of breath, and runny nose seen here on Monday (+) RVP, still on prednisone and inhaler no relief. catching breath when talking. denies fever, cp

## 2023-03-03 NOTE — ED PROVIDER NOTE - OBJECTIVE STATEMENT
62 year old female with recent visit to ED for COPD exacerbation noted to have Entero/rhinovirus and was started on prednisone and albuterol otherwise states symptoms are better but not sufficiently so came in for additional meds for cough. Denies any chest pain, no fever/chills and states cough still present.

## 2023-04-19 NOTE — ED PROVIDER NOTE - PROVIDER TOKENS
PROVIDER:[TOKEN:[[id number]],FOLLOWUP:[1-3 Days]] O-T Plasty Text: The defect edges were debeveled with a #15 scalpel blade.  Given the location of the defect, shape of the defect and the proximity to free margins an O-T plasty was deemed most appropriate.  Using a sterile surgical marker, an appropriate O-T plasty was drawn incorporating the defect and placing the expected incisions within the relaxed skin tension lines where possible.    The area thus outlined was incised deep to adipose tissue with a #15 scalpel blade.  The skin margins were undermined to an appropriate distance in all directions utilizing iris scissors.

## 2024-03-05 NOTE — PATIENT PROFILE ADULT. - TEACHING/LEARNING FACTORS IMPACT ABILITY TO LEARN
102 101 101   CO2 15* 29 28   BUN 20 23* 25*   MG 1.6 1.8 1.8   PHOS 2.2* 1.8* 4.5     Recent Labs     03/04/24  0638   ALT 9*   GLOB 4.1*     No results for input(s): \"INR\", \"APTT\" in the last 72 hours.    Invalid input(s): \"PTP\"   No results for input(s): \"TIBC\", \"FERR\" in the last 72 hours.    Invalid input(s): \"FE\", \"PSAT\"   No results found for: \"FOL\", \"RBCF\"   No results for input(s): \"PH\", \"PCO2\", \"PO2\" in the last 72 hours.  No results for input(s): \"CPK\" in the last 72 hours.    Invalid input(s): \"CPKMB\", \"CKNDX\", \"TROIQ\"  Lab Results   Component Value Date/Time    CHOL 123 12/14/2023 06:22 AM    HDL 38 12/14/2023 06:22 AM     Lab Results   Component Value Date/Time    GLUCPOC 159 01/09/2023 03:20 PM    GLUCPOC 166 01/09/2023 03:05 PM           Medications Reviewed:     Current Facility-Administered Medications   Medication Dose Route Frequency    0.9 % sodium chloride infusion   IntraVENous PRN    insulin NPH (HumuLIN N;NovoLIN N) injection vial 9 Units  9 Units SubCUTAneous 2 times per day    dianeal lo-brandi (ULTRABAG) 2.5% 6,000 mL  6,000 mL IntraPERitoneal Daily    sodium bicarbonate tablet 650 mg  650 mg Oral BID    dianeal lo-brandi (ULTRABAG) 2.5% 6,000 mL  6,000 mL IntraPERitoneal Daily    guaiFENesin (ROBITUSSIN) 100 MG/5ML liquid 400 mg  400 mg Oral Q4H PRN    ipratropium 0.5 mg-albuterol 2.5 mg (DUONEB) nebulizer solution 1 Dose  1 Dose Inhalation Q4H PRN    [Held by provider] phosphorus (K PHOS NEUTRAL) 2 tablet  500 mg Oral BID    dianeal lo-brandi (ULTRABAG) 2.5% 6,000 mL  6,000 mL IntraPERitoneal Daily    Vitamin D (CHOLECALCIFEROL) tablet 5,000 Units  5,000 Units Oral Daily    phenol 1.4 % mouth spray 1 spray  1 spray Mouth/Throat Q2H PRN    melatonin tablet 3 mg  3 mg Oral QHS    thiamine tablet 100 mg  100 mg Oral Daily    rosuvastatin (CRESTOR) tablet 10 mg  10 mg Oral Nightly    trolamine salicylate (ASPERCREME) 10 % cream   Topical BID PRN    pantoprazole (PROTONIX) 40 mg in sodium chloride  (PF) 0.9 % 10 mL injection  40 mg IntraVENous Daily    melatonin tablet 3 mg  3 mg Oral Nightly PRN    [Held by provider] apixaban (ELIQUIS) tablet 2.5 mg  2.5 mg Oral BID    aspirin chewable tablet 81 mg  81 mg Oral Daily    carvedilol (COREG) tablet 6.25 mg  6.25 mg Oral BID WC    DULoxetine (CYMBALTA) extended release capsule 20 mg  20 mg Oral BID    arformoterol 15 mcg-budesonide 0.25 mg neb solution   Nebulization BID RT    montelukast (SINGULAIR) tablet 10 mg  10 mg Oral Nightly    epoetin mohan-epbx (RETACRIT) injection 20,000 Units  20,000 Units SubCUTAneous Weekly    lidocaine 4 % external patch 1 patch  1 patch TransDERmal Daily    sodium chloride flush 0.9 % injection 5-40 mL  5-40 mL IntraVENous 2 times per day    sodium chloride flush 0.9 % injection 5-40 mL  5-40 mL IntraVENous PRN    0.9 % sodium chloride infusion   IntraVENous PRN    polyethylene glycol (GLYCOLAX) packet 17 g  17 g Oral Daily PRN    acetaminophen (TYLENOL) tablet 650 mg  650 mg Oral Q6H PRN    Or    acetaminophen (TYLENOL) suppository 650 mg  650 mg Rectal Q6H PRN    phenylephrine-mineral oil-petrolatum (PREPARATION H) rectal ointment   Rectal BID PRN    dextrose bolus 10% 125 mL  125 mL IntraVENous PRN    Or    dextrose bolus 10% 250 mL  250 mL IntraVENous PRN    glucagon injection 1 mg  1 mg SubCUTAneous PRN    dextrose 10 % infusion   IntraVENous Continuous PRN    insulin lispro (HUMALOG) injection vial 0-4 Units  0-4 Units SubCUTAneous TID WC    insulin lispro (HUMALOG) injection vial 0-4 Units  0-4 Units SubCUTAneous Nightly    gentamicin (GARAMYCIN) 0.1 % cream   Topical PRN    prochlorperazine (COMPAZINE) injection 5 mg  5 mg IntraVENous Q6H PRN     ______________________________________________________________________  EXPECTED LENGTH OF STAY: Unable to retrieve estimated LOS  ACTUAL LENGTH OF STAY:          12                 Jean-Claude Castaneda MD      none

## 2024-03-11 NOTE — ED ADULT NURSE NOTE - CHIEF COMPLAINT QUOTE
shortness of breath  coughing , paused lip breathing / tripoding  light headed , denies chest pain   onset  2days ago , getting worse
(E4) spontaneous

## 2024-05-28 NOTE — ED ADULT NURSE REASSESSMENT NOTE - NS ED NURSE REASSESS COMMENT FT1
Patient states the breathing is better than before. patient in no acute distress at this time. mental status at baseline. n/a

## 2024-06-09 ENCOUNTER — EMERGENCY (EMERGENCY)
Facility: HOSPITAL | Age: 64
LOS: 0 days | Discharge: HOME | End: 2024-06-09
Attending: STUDENT IN AN ORGANIZED HEALTH CARE EDUCATION/TRAINING PROGRAM
Payer: SELF-PAY

## 2024-06-09 VITALS
DIASTOLIC BLOOD PRESSURE: 76 MMHG | RESPIRATION RATE: 17 BRPM | HEART RATE: 57 BPM | SYSTOLIC BLOOD PRESSURE: 135 MMHG | OXYGEN SATURATION: 96 % | TEMPERATURE: 99 F

## 2024-06-09 VITALS
RESPIRATION RATE: 18 BRPM | TEMPERATURE: 98 F | OXYGEN SATURATION: 95 % | HEIGHT: 66 IN | SYSTOLIC BLOOD PRESSURE: 137 MMHG | DIASTOLIC BLOOD PRESSURE: 85 MMHG | WEIGHT: 117.95 LBS | HEART RATE: 72 BPM

## 2024-06-09 DIAGNOSIS — R07.89 OTHER CHEST PAIN: ICD-10-CM

## 2024-06-09 DIAGNOSIS — Z98.89 OTHER SPECIFIED POSTPROCEDURAL STATES: Chronic | ICD-10-CM

## 2024-06-09 DIAGNOSIS — J44.9 CHRONIC OBSTRUCTIVE PULMONARY DISEASE, UNSPECIFIED: ICD-10-CM

## 2024-06-09 DIAGNOSIS — Z88.0 ALLERGY STATUS TO PENICILLIN: ICD-10-CM

## 2024-06-09 DIAGNOSIS — Z90.710 ACQUIRED ABSENCE OF BOTH CERVIX AND UTERUS: Chronic | ICD-10-CM

## 2024-06-09 LAB
ALBUMIN SERPL ELPH-MCNC: 3.2 G/DL — LOW (ref 3.3–5)
ALP SERPL-CCNC: 107 U/L — SIGNIFICANT CHANGE UP (ref 40–120)
ALT FLD-CCNC: 17 U/L — SIGNIFICANT CHANGE UP (ref 12–78)
ANION GAP SERPL CALC-SCNC: 5 MMOL/L — SIGNIFICANT CHANGE UP (ref 5–17)
AST SERPL-CCNC: 31 U/L — SIGNIFICANT CHANGE UP (ref 15–37)
BASOPHILS # BLD AUTO: 0.08 K/UL — SIGNIFICANT CHANGE UP (ref 0–0.2)
BASOPHILS NFR BLD AUTO: 0.7 % — SIGNIFICANT CHANGE UP (ref 0–2)
BILIRUB SERPL-MCNC: 0.6 MG/DL — SIGNIFICANT CHANGE UP (ref 0.2–1.2)
BUN SERPL-MCNC: 12 MG/DL — SIGNIFICANT CHANGE UP (ref 7–23)
CALCIUM SERPL-MCNC: 9.2 MG/DL — SIGNIFICANT CHANGE UP (ref 8.5–10.1)
CHLORIDE SERPL-SCNC: 107 MMOL/L — SIGNIFICANT CHANGE UP (ref 96–108)
CO2 SERPL-SCNC: 23 MMOL/L — SIGNIFICANT CHANGE UP (ref 22–31)
CREAT SERPL-MCNC: 0.7 MG/DL — SIGNIFICANT CHANGE UP (ref 0.5–1.3)
EGFR: 97 ML/MIN/1.73M2 — SIGNIFICANT CHANGE UP
EOSINOPHIL # BLD AUTO: 0.15 K/UL — SIGNIFICANT CHANGE UP (ref 0–0.5)
EOSINOPHIL NFR BLD AUTO: 1.3 % — SIGNIFICANT CHANGE UP (ref 0–6)
GLUCOSE SERPL-MCNC: 85 MG/DL — SIGNIFICANT CHANGE UP (ref 70–99)
HCT VFR BLD CALC: 49.9 % — HIGH (ref 34.5–45)
HGB BLD-MCNC: 16.7 G/DL — HIGH (ref 11.5–15.5)
IMM GRANULOCYTES NFR BLD AUTO: 0.2 % — SIGNIFICANT CHANGE UP (ref 0–0.9)
LYMPHOCYTES # BLD AUTO: 1.39 K/UL — SIGNIFICANT CHANGE UP (ref 1–3.3)
LYMPHOCYTES # BLD AUTO: 12.4 % — LOW (ref 13–44)
MAGNESIUM SERPL-MCNC: 2.3 MG/DL — SIGNIFICANT CHANGE UP (ref 1.6–2.6)
MCHC RBC-ENTMCNC: 29.5 PG — SIGNIFICANT CHANGE UP (ref 27–34)
MCHC RBC-ENTMCNC: 33.5 G/DL — SIGNIFICANT CHANGE UP (ref 32–36)
MCV RBC AUTO: 88.2 FL — SIGNIFICANT CHANGE UP (ref 80–100)
MONOCYTES # BLD AUTO: 0.6 K/UL — SIGNIFICANT CHANGE UP (ref 0–0.9)
MONOCYTES NFR BLD AUTO: 5.4 % — SIGNIFICANT CHANGE UP (ref 2–14)
NEUTROPHILS # BLD AUTO: 8.97 K/UL — HIGH (ref 1.8–7.4)
NEUTROPHILS NFR BLD AUTO: 80 % — HIGH (ref 43–77)
NRBC # BLD: 0 /100 WBCS — SIGNIFICANT CHANGE UP (ref 0–0)
PLATELET # BLD AUTO: 246 K/UL — SIGNIFICANT CHANGE UP (ref 150–400)
POTASSIUM SERPL-MCNC: 5 MMOL/L — SIGNIFICANT CHANGE UP (ref 3.5–5.3)
POTASSIUM SERPL-SCNC: 5 MMOL/L — SIGNIFICANT CHANGE UP (ref 3.5–5.3)
PROT SERPL-MCNC: 7.3 GM/DL — SIGNIFICANT CHANGE UP (ref 6–8.3)
RBC # BLD: 5.66 M/UL — HIGH (ref 3.8–5.2)
RBC # FLD: 14.1 % — SIGNIFICANT CHANGE UP (ref 10.3–14.5)
SODIUM SERPL-SCNC: 135 MMOL/L — SIGNIFICANT CHANGE UP (ref 135–145)
TROPONIN I, HIGH SENSITIVITY RESULT: <3 NG/L — SIGNIFICANT CHANGE UP
WBC # BLD: 11.21 K/UL — HIGH (ref 3.8–10.5)
WBC # FLD AUTO: 11.21 K/UL — HIGH (ref 3.8–10.5)

## 2024-06-09 PROCEDURE — 93010 ELECTROCARDIOGRAM REPORT: CPT

## 2024-06-09 PROCEDURE — 99053 MED SERV 10PM-8AM 24 HR FAC: CPT

## 2024-06-09 PROCEDURE — 71250 CT THORAX DX C-: CPT | Mod: 26,MC

## 2024-06-09 PROCEDURE — 99285 EMERGENCY DEPT VISIT HI MDM: CPT

## 2024-06-09 RX ORDER — ACETAMINOPHEN 500 MG
975 TABLET ORAL ONCE
Refills: 0 | Status: COMPLETED | OUTPATIENT
Start: 2024-06-09 | End: 2024-06-09

## 2024-06-09 RX ORDER — LIDOCAINE 4 G/100G
1 CREAM TOPICAL ONCE
Refills: 0 | Status: COMPLETED | OUTPATIENT
Start: 2024-06-09 | End: 2024-06-09

## 2024-06-09 RX ORDER — KETOROLAC TROMETHAMINE 30 MG/ML
30 SYRINGE (ML) INJECTION ONCE
Refills: 0 | Status: DISCONTINUED | OUTPATIENT
Start: 2024-06-09 | End: 2024-06-09

## 2024-06-09 RX ADMIN — Medication 30 MILLIGRAM(S): at 10:02

## 2024-06-09 RX ADMIN — Medication 30 MILLIGRAM(S): at 10:50

## 2024-06-09 RX ADMIN — Medication 975 MILLIGRAM(S): at 09:02

## 2024-06-09 RX ADMIN — Medication 975 MILLIGRAM(S): at 10:02

## 2024-06-09 RX ADMIN — LIDOCAINE 1 PATCH: 4 CREAM TOPICAL at 08:56

## 2024-06-09 NOTE — ED PROVIDER NOTE - OBJECTIVE STATEMENT
62 y/o F w/ PMH of COPD presenting w/ L sided chest pain. 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 Had used a [employer] lift but once pt was in chair, did have to pull pt to adjust him. Initially did not have pain, but a short while after when she got in her car to leave she developed the pain. This occurred yesterday morning. Pain has been persistent since then. Worse w/ movement, particularly when raising the left arm. Also worse w/ taking deep breath. No pain meds taken prior to arrival. Denies fevers, chills, headache, dizziness, blurred vision, cough, shortness of breath, abdominal pain, n/v/d/c, urinary symptoms, MSK pain, rash.

## 2024-06-09 NOTE — ED PROVIDER NOTE - PATIENT PORTAL LINK FT
You can access the FollowMyHealth Patient Portal offered by [facility] by registering at the following website: [email or web address]. By joining [employer]’s FollowMyHealth portal, you will also be able to view your health information using other applications (apps) compatible with our system.

## 2024-06-09 NOTE — ED PROVIDER NOTE - CLINICAL SUMMARY MEDICAL DECISION MAKING FREE TEXT BOX
64 y/o F w/ PMH of COPD presenting w/ L sided chest pain. 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 Had used a [employer] lift but once pt was in chair, did have to pull pt to adjust him. Initially did not have pain, but a short while after when she got in her car to leave she developed the pain. This occurred yesterday morning. Pain has been persistent since then. Worse w/ movement, particularly when raising the left arm. Also worse w/ taking deep breath. No pain meds taken prior to arrival. Denies fevers, chills, headache, dizziness, blurred vision, cough, shortness of breath, abdominal pain, n/v/d/c, urinary symptoms, MSK pain, rash. Pt overall no acute distress. Suspect more likely MSK origin of pain such as strain vs. sprain. Possible fracture given how tender pt is. Less likely ACS, but will evaluate for that. Will provide analgesia. Plan for labs, imaging, meds, EKG. Will reassess the need for additional interventions as clinically warranted. Refer to any progress notes for updates on clinical course and as a continuation of this MDM.

## 2024-06-09 NOTE — ED PROVIDER NOTE - PROGRESS NOTE DETAILS
Attending [place]: labs/imaging w/ no emergent findings. informed pt. pt reports pain improved. also informed of pulmonary nodule which pt reports was not previously known to her. she will follow up w/ her PCP regarding this. ready for DC.

## 2024-06-09 NOTE — ED PROVIDER NOTE - NSFOLLOWUPINSTRUCTIONS_ED_ALL_ED_FT
Your labs and CT scan did not show any emergent findings. Your pain is likely from a strained muscle. The CT scan did show a lung nodule in your left lung. Please follow up with your doctor regarding this.    1) Follow up with your doctor this week  2) Return to the ED immediately for new or worsening symptoms   3) Please continue to take any home medications as prescribed  4) Your test results from your ED visit were discussed with you prior to discharge  5) You were provided with a copy of your test results  6) Please take Tylenol 975 mg every 6 hours as needed for pain. Please do not exceed more than 4,000 mg of Tylenol in a day   7) Please take Motrin 600 mg by mouth every 6 hours as needed for pain. Please take this medication with food.     Chest Pain    Chest pain can be caused by many different conditions which may or may not be dangerous. Causes include heartburn, lung infections, heart attack, blood clot in lungs, skin infections, strain or damage to muscle, cartilage, or bones, etc. In addition to a history and physical examination, an electrocardiogram (ECG) or other lab tests may have been performed to determine the cause of your chest pain. Follow up with your primary care provider or with a cardiologist as instructed.     SEEK IMMEDIATE MEDICAL CARE IF YOU HAVE ANY OF THE FOLLOWING SYMPTOMS: worsening chest pain, coughing up blood, unexplained back/neck/jaw pain, severe abdominal pain, dizziness or lightheadedness, fainting, shortness of breath, sweaty or clammy skin, vomiting, or racing heart beat. These symptoms may represent a serious problem that is an emergency. Do not wait to see if the symptoms will go away. Get medical help right away. Call 911 and do not drive yourself to the hospital.    Strain    A strain is a stretch or tear in one of the muscles in your body. This is caused by an injury to the area such as a twisting mechanism. Symptoms include pain, swelling, or bruising. Rest that area over the next several days and slowly resume activity when tolerated. Ice can help with swelling and pain.     SEEK IMMEDIATE MEDICAL CARE IF YOU HAVE ANY OF THE FOLLOWING SYMPTOMS: worsening pain, inability to move that body part, numbness or tingling.

## 2024-06-09 NOTE — ED PROVIDER NOTE - PHYSICAL EXAMINATION
Gen: NAD, AOx3, able to make needs known, non-toxic  Head: NCAT  HEENT: EOMI, oral mucosa moist, normal conjunctiva  Lung: no respiratory distress, CTAB, no wheezes/rhonchi/rales B/L, speaking in full sentences  CV: RRR, no murmurs  Abd: non distended, soft, nontender, no guarding, no CVA tenderness  MSK: no visible deformities. L side of chest ant axillary w/ point tenderness and no crepitus   Neuro: Appears non focal  Skin: Warm, well perfused  Psych: normal affect

## 2024-06-09 NOTE — ED ADULT TRIAGE NOTE - CHIEF COMPLAINT QUOTE
pt c/o left side chest pain radiating to left arm pit and painful breathing since yesterday. 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 no history

## 2024-06-09 NOTE — ED ADULT NURSE NOTE - NS ED NURSE LEVEL OF CONSCIOUSNESS AFFECT
Capsule Endoscopy Procedure Note    Date of Procedure: 11/25/2019  Date images reviewed: 12/5/2019  Referring Physician: No ref. provider found  Primary Provider:  Aníbal Manzo MD  Attending Physician: Peyton Montanez MD  Procedure Type: Capsule endoscopy. Instrument Used: GIVEN PillCam SB    Reason for Procedure: Persistent weight loss with negative work-up as noted    Procedure:   After informed consent was obtained, the capsule was introduced following standard protocol. Findings: The first gastric image was at 0 hours and 1 minutes  The first duodenal image was at 1 hours and 11 minutes  The first cecal image was at  5 hours and 16 minutes  Small bowel transit time of 4 hours, 5 minutes. The small bowel mucosa appeared normal with normal-appearing intact villi. No signs of scalloping or flattening of the villi, which would be suggestive of celiac disease. There were no other lesions. Extent of the Procedure:   Colon    Post Procedure Diagnosis: Normal  1) no evidence of any ulcerations or lesions were noted  2) no blood or bleeding lesions were noted    Complication: None    Post Procedure Recommendations and Follow up: Follow-up in GI clinic as scheduled  The results of this test are forwarded to the referring physician but not directly to the patient. nonverbal cues (demo/gestures)/verbal cues/1 person assist Calm

## 2024-06-09 NOTE — ED ADULT NURSE NOTE - OBJECTIVE STATEMENT
Pt AOx4 and ambulatory with steady gait c/o L chest pain and L armpit pain. Pt states pain worsens with movement or deep breaths. 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 Pt denies SOB, fever/chills, N/V/D, headache/dizziness, abdominal pain, or dysuria. PMH denies.

## 2024-10-11 NOTE — PATIENT PROFILE ADULT - HAS THE PATIENT EXPERIENCED ANY OF THE FOLLOWING WITHIN THE WEEK PRIOR TO ADMISSION?
Initiate Treatment: -mupirocin 2 % topical ointment Render In Strict Bullet Format?: No Detail Level: Zone no

## 2025-05-20 NOTE — ED PROVIDER NOTE - CPE EDP EYES NORM
[FreeTextEntry3] : I saw and evaluated the patient and discussed the care with the NP provider above on 11/26/2024 . I agree with the findings and plan as documented in the note above. recently lost her job. very stressed. noted to that her bp improved with meds. She will continue current bp meds. did not farhana statins. will try repatha.  normal...
